# Patient Record
Sex: FEMALE | Race: WHITE | NOT HISPANIC OR LATINO | Employment: FULL TIME | URBAN - METROPOLITAN AREA
[De-identification: names, ages, dates, MRNs, and addresses within clinical notes are randomized per-mention and may not be internally consistent; named-entity substitution may affect disease eponyms.]

---

## 2022-10-04 ENCOUNTER — PATIENT MESSAGE (OUTPATIENT)
Dept: INTERNAL MEDICINE | Facility: CLINIC | Age: 52
End: 2022-10-04
Payer: COMMERCIAL

## 2022-11-22 ENCOUNTER — TELEPHONE (OUTPATIENT)
Dept: PRIMARY CARE CLINIC | Facility: CLINIC | Age: 52
End: 2022-11-22
Payer: COMMERCIAL

## 2022-11-22 ENCOUNTER — OFFICE VISIT (OUTPATIENT)
Dept: PRIMARY CARE CLINIC | Facility: CLINIC | Age: 52
End: 2022-11-22
Payer: COMMERCIAL

## 2022-11-22 VITALS
SYSTOLIC BLOOD PRESSURE: 90 MMHG | BODY MASS INDEX: 17.67 KG/M2 | WEIGHT: 106.06 LBS | DIASTOLIC BLOOD PRESSURE: 72 MMHG | HEART RATE: 76 BPM | OXYGEN SATURATION: 98 % | TEMPERATURE: 97 F | HEIGHT: 65 IN

## 2022-11-22 DIAGNOSIS — B00.1 HERPES LABIALIS: ICD-10-CM

## 2022-11-22 DIAGNOSIS — E28.319 PREMATURE MENOPAUSE: ICD-10-CM

## 2022-11-22 DIAGNOSIS — Z12.31 ENCOUNTER FOR SCREENING MAMMOGRAM FOR MALIGNANT NEOPLASM OF BREAST: Primary | ICD-10-CM

## 2022-11-22 DIAGNOSIS — Z00.00 PREVENTATIVE HEALTH CARE: ICD-10-CM

## 2022-11-22 PROCEDURE — 99999 PR PBB SHADOW E&M-EST. PATIENT-LVL V: CPT | Mod: PBBFAC,,, | Performed by: INTERNAL MEDICINE

## 2022-11-22 PROCEDURE — 3008F BODY MASS INDEX DOCD: CPT | Mod: CPTII,S$GLB,, | Performed by: INTERNAL MEDICINE

## 2022-11-22 PROCEDURE — 90686 IIV4 VACC NO PRSV 0.5 ML IM: CPT | Mod: S$GLB,,, | Performed by: INTERNAL MEDICINE

## 2022-11-22 PROCEDURE — 1159F PR MEDICATION LIST DOCUMENTED IN MEDICAL RECORD: ICD-10-PCS | Mod: CPTII,S$GLB,, | Performed by: INTERNAL MEDICINE

## 2022-11-22 PROCEDURE — 90471 IMMUNIZATION ADMIN: CPT | Mod: S$GLB,,, | Performed by: INTERNAL MEDICINE

## 2022-11-22 PROCEDURE — 3078F PR MOST RECENT DIASTOLIC BLOOD PRESSURE < 80 MM HG: ICD-10-PCS | Mod: CPTII,S$GLB,, | Performed by: INTERNAL MEDICINE

## 2022-11-22 PROCEDURE — 1159F MED LIST DOCD IN RCRD: CPT | Mod: CPTII,S$GLB,, | Performed by: INTERNAL MEDICINE

## 2022-11-22 PROCEDURE — 3078F DIAST BP <80 MM HG: CPT | Mod: CPTII,S$GLB,, | Performed by: INTERNAL MEDICINE

## 2022-11-22 PROCEDURE — 3008F PR BODY MASS INDEX (BMI) DOCUMENTED: ICD-10-PCS | Mod: CPTII,S$GLB,, | Performed by: INTERNAL MEDICINE

## 2022-11-22 PROCEDURE — 99999 PR PBB SHADOW E&M-EST. PATIENT-LVL V: ICD-10-PCS | Mod: PBBFAC,,, | Performed by: INTERNAL MEDICINE

## 2022-11-22 PROCEDURE — 3074F PR MOST RECENT SYSTOLIC BLOOD PRESSURE < 130 MM HG: ICD-10-PCS | Mod: CPTII,S$GLB,, | Performed by: INTERNAL MEDICINE

## 2022-11-22 PROCEDURE — 90686 FLU VACCINE (QUAD) GREATER THAN OR EQUAL TO 3YO PRESERVATIVE FREE IM: ICD-10-PCS | Mod: S$GLB,,, | Performed by: INTERNAL MEDICINE

## 2022-11-22 PROCEDURE — 90471 FLU VACCINE (QUAD) GREATER THAN OR EQUAL TO 3YO PRESERVATIVE FREE IM: ICD-10-PCS | Mod: S$GLB,,, | Performed by: INTERNAL MEDICINE

## 2022-11-22 PROCEDURE — 3074F SYST BP LT 130 MM HG: CPT | Mod: CPTII,S$GLB,, | Performed by: INTERNAL MEDICINE

## 2022-11-22 PROCEDURE — 99386 PR PREVENTIVE VISIT,NEW,40-64: ICD-10-PCS | Mod: 25,S$GLB,, | Performed by: INTERNAL MEDICINE

## 2022-11-22 PROCEDURE — 99386 PREV VISIT NEW AGE 40-64: CPT | Mod: 25,S$GLB,, | Performed by: INTERNAL MEDICINE

## 2022-11-22 RX ORDER — VALACYCLOVIR HYDROCHLORIDE 1 G/1
4000 TABLET, FILM COATED ORAL
COMMUNITY
Start: 2022-08-01

## 2022-11-22 NOTE — PATIENT INSTRUCTIONS
COVID bivalent booster    Get Shingrex x 2     Routine labs    Flu shot today    [phone] - send dr. Lamb requirements for permanent resident status    routine labs  -flu shot today  -get COVID booster and Shingrex  -check MMR titers and Hep B quantitative  - give H. influ Type B vaccine and Tdap vaccines in clinic

## 2022-11-22 NOTE — ASSESSMENT & PLAN NOTE
-routine labs  -flu shot today  -get COVID booster and Shingrex  -check MMR titers and Hep B quantitative  - give H. influ Type B vaccine and Tdap vaccines in clinic

## 2022-11-22 NOTE — PROGRESS NOTES
[facility] Progress Note      Chief Complaint     Annual Exam       Subjective:      History of Present Illness:  Teagan Krause is a 52 y.o. female here to establish care.    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    Past Surgical History:  No past surgical history on file.    Allergies:  Review of patient's allergies indicates:   Allergen Reactions    Lactase Other (See Comments)     Other reaction(s): Abdominal Pain       Home Medications:  Current Outpatient Medications   Medication Sig Dispense Refill    valACYclovir (VALTREX) 1000 MG tablet Take 4,000 mg by mouth.       No current facility-administered medications for this visit.        Family History:  No family history on file.    Social History:       Review of Systems:  Pertinent positives and negatives listed in HPI. All other systems are reviewed and are negative.    Health Maintaince :   The patient has no Health Maintenance topics of status Not Due        Eye: UTD  Dental: UTD    Immunizations:   Tdap: 2013.  Influenza: needs today.  Shingrex x 2: eligible  COVID: x 4; needs COVID booster   Hepatitis C: needs  Cancer Screening:  PAP: refuses for now; last pap at 30 y/o   Mammogram: needs  Colonoscopy: refuses for now    The ASCVD Risk score (Olga Lidia PRADO, et al., 2019) failed to calculate for the following reasons:     "Cannot find a previous HDL lab    Cannot find a previous total cholesterol lab    The smoking status is invalid      Objective:   BP 90/72   Pulse 76   Temp 97 °F (36.1 °C) (Oral)   Ht 5' 5" (1.651 m)   Wt 48.1 kg (106 lb 0.7 oz)   SpO2 98%   BMI 17.65 kg/m²      Body mass index is 17.65 kg/m².       Physical Examination:  General: Alert and awake in no apparent distress  HEENT: Normocephalic and atraumatic; Tms WNL  Eyes:  PERRL; EOMi with anicteric sclera and clear conjunctivae  Mouth:  Oropharynx clear and without exudate; moist mucous membranes  Neck:   Cervical nodes not enlarged; supple; no bruits  Cardio:  Regular rate and rhythm with normal S1 and S2; no murmurs or rubs  Resp:  CTAB; respirations unlabored; no wheezes, crackles or rhonchi  Abdom: Soft, NTND with normoactive bowel sounds; negative HSM  Extrem: Warm and well-perfused with no clubbing, cyanosis or edema  Skin:  No rashes, lesions, or color changes  Pulses:  2+ and symmetric distally  Neuro:  AAOx3; cooperative and pleasant with no focal deficits    Laboratory:      Most Recent Data:  Lab Results   Component Value Date    WBC 5.11 11/28/2006    HGB 13.2 11/28/2006    HCT 39.5 11/28/2006     11/28/2006    ALT 6 11/28/2006    AST 16 11/28/2006     11/28/2006    K 4.2 11/28/2006     11/28/2006    BUN 15 11/28/2006    CO2 27 11/28/2006    TSH 0.79 02/22/2007              CBC:   WBC   Date Value Ref Range Status   11/28/2006 5.11 4.8 - 10.8 K/uL Final     Hemoglobin   Date Value Ref Range Status   11/28/2006 13.2 12.0 - 16.0 gm/dl Final     Hematocrit   Date Value Ref Range Status   11/28/2006 39.5 37.0 - 48.5 % Final     Platelets   Date Value Ref Range Status   11/28/2006 303 150 - 350 K/uL Final     MCV   Date Value Ref Range Status   11/28/2006 93.8 82 - 95 fL Final     RDW   Date Value Ref Range Status   11/28/2006 12.0 11.5 - 14.5 % Final     BMP:   Sodium   Date Value Ref Range Status   11/28/2006 142 136 - 145 mMol/l " Final     Potassium   Date Value Ref Range Status   11/28/2006 4.2 3.3 - 5.3 mMol/l Final     Chloride   Date Value Ref Range Status   11/28/2006 101 95 - 110 mMol/l Final     CO2   Date Value Ref Range Status   11/28/2006 27 23.0 - 29.0 mEq/L Final     BUN   Date Value Ref Range Status   11/28/2006 15 5 - 23 mg/dl Final     Creatinine   Date Value Ref Range Status   11/28/2006 0.9 0.5 - 1.4 mg/dl Final     Glucose   Date Value Ref Range Status   11/28/2006 71 70 - 110 mg/dl Final     Calcium   Date Value Ref Range Status   11/28/2006 10.0 8.7 - 10.5 mg/dl Final     LFTs:   Total Protein   Date Value Ref Range Status   11/28/2006 7.7 6.0 - 8.4 gm/dl Final     Albumin   Date Value Ref Range Status   11/28/2006 4.8 3.5 - 5.2 g/dl Final     Total Bilirubin   Date Value Ref Range Status   11/28/2006 0.2 0.1 - 1.0 mg/dl Final     AST   Date Value Ref Range Status   11/28/2006 16 0 - 31 U/L Final     Alkaline Phosphatase   Date Value Ref Range Status   11/28/2006 56 45 - 130 U/L Final     ALT   Date Value Ref Range Status   11/28/2006 6 0 - 31 U/L Final     Coags: No results found for: INR, PROTIME, PTT  FLP:    No results found for: CHOL  No results found for: HDL  No results found for: LDLCALC  No results found for: TRIG  No results found for: CHOLHDL   DM:      Creatinine   Date Value Ref Range Status   11/28/2006 0.9 0.5 - 1.4 mg/dl Final     Thyroid:   TSH   Date Value Ref Range Status   02/22/2007 0.79 0.4 - 4.0 uIU/ml Final     T4, Total   Date Value Ref Range Status   02/22/2007 7.9 4.5 - 11.5 ug/dl Final     Anemia: No results found for: IRON, TIBC, FERRITIN, [id number], FOLATE  Cardiac: No results found for: TROPONINI, CKTOTAL, CKMB, BNP  Urinalysis: No results found for: LABURIN, COLORU, PHUA, CLARITYU, SPECGRAV, LABSPEC, NITRITE, PROTEINUR, GLUCOSEU, KETONESU, UROBILINOGEN, BILIRUBINUR, BLOODU, RBCU, WBCUA    Other Laboratory Data:      Other Results:  EKG (my interpretation):     Radiology:  No image results  found.          Assessment/Plan     Teagan Krause is a 52 y.o. female with:  1. Encounter for screening mammogram for malignant neoplasm of breast  -     Mammo Digital Diagnostic Bilat; Future; Expected date: 11/22/2022  -     Lipid Panel; Future; Expected date: 11/22/2022  -     Urinalysis; Future; Expected date: 11/22/2022  -     Comprehensive Metabolic Panel; Future; Expected date: 11/22/2022  -     CBC Auto Differential; Future; Expected date: 11/22/2022  -     Hepatitis C Antibody; Future; Expected date: 11/22/2022  -     HIV 1/2 Ag/Ab (4th Gen); Future; Expected date: 11/22/2022    2. Preventative health care  Assessment & Plan:  -routine labs  -flu shot today  -get COVID booster and Shingrex  -check MMR titers and Hep B quantitative  - give H. influ Type B vaccine and Tdap vaccines in clinic    Orders:  -     Mumps, IgG Screen; Future; Expected date: 11/22/2022  -     Rubeola antibody IgG; Future; Expected date: 11/22/2022  -     Rubella antibody, IgG; Future; Expected date: 11/22/2022  -     Hepatitis B Surface Ab, Quantitative; Future; Expected date: 11/22/2022  -     (In Office Administered) Tdap Vaccine  -     (In Office Administered) HiB (PRP-OMP)Conjugate Vaccine    3. Premature menopause  Assessment & Plan:  -check Vit D level and get DEXA  -cont weight-bearing exercise    Orders:  -     Vitamin D; Future; Expected date: 11/22/2022  -     DXA Bone Density Spine And Hip; Future; Expected date: 11/22/2022    4. Herpes labialis    Other orders  -     Influenza - Quadrivalent (PF)           I spent a total of 28 minutes on the day of the visit.This includes face to face time and non-face to face time preparing to see the patient (eg, review of tests), obtaining and/or reviewing separately obtained history, documenting clinical information in the electronic or other health record, independently interpreting results and communicating results to the patient/family/caregiver, or care coordinator.   Code  Status:     Cara Lamb MD

## 2022-12-20 ENCOUNTER — LAB VISIT (OUTPATIENT)
Dept: LAB | Facility: HOSPITAL | Age: 52
End: 2022-12-20
Payer: COMMERCIAL

## 2022-12-20 DIAGNOSIS — Z12.31 ENCOUNTER FOR SCREENING MAMMOGRAM FOR MALIGNANT NEOPLASM OF BREAST: ICD-10-CM

## 2022-12-20 DIAGNOSIS — Z00.00 PREVENTATIVE HEALTH CARE: ICD-10-CM

## 2022-12-20 DIAGNOSIS — E28.319 PREMATURE MENOPAUSE: ICD-10-CM

## 2022-12-20 LAB
25(OH)D3+25(OH)D2 SERPL-MCNC: 52 NG/ML (ref 30–96)
ALBUMIN SERPL BCP-MCNC: 4.3 G/DL (ref 3.5–5.2)
ALP SERPL-CCNC: 72 U/L (ref 55–135)
ALT SERPL W/O P-5'-P-CCNC: 8 U/L (ref 10–44)
ANION GAP SERPL CALC-SCNC: 11 MMOL/L (ref 8–16)
AST SERPL-CCNC: 19 U/L (ref 10–40)
BASOPHILS # BLD AUTO: 0.04 K/UL (ref 0–0.2)
BASOPHILS NFR BLD: 1.2 % (ref 0–1.9)
BILIRUB SERPL-MCNC: 0.4 MG/DL (ref 0.1–1)
BUN SERPL-MCNC: 16 MG/DL (ref 6–20)
CALCIUM SERPL-MCNC: 9.7 MG/DL (ref 8.7–10.5)
CHLORIDE SERPL-SCNC: 103 MMOL/L (ref 95–110)
CHOLEST SERPL-MCNC: 236 MG/DL (ref 120–199)
CHOLEST/HDLC SERPL: 2.9 {RATIO} (ref 2–5)
CO2 SERPL-SCNC: 25 MMOL/L (ref 23–29)
CREAT SERPL-MCNC: 0.7 MG/DL (ref 0.5–1.4)
DIFFERENTIAL METHOD: ABNORMAL
EOSINOPHIL # BLD AUTO: 0.1 K/UL (ref 0–0.5)
EOSINOPHIL NFR BLD: 1.8 % (ref 0–8)
ERYTHROCYTE [DISTWIDTH] IN BLOOD BY AUTOMATED COUNT: 12.7 % (ref 11.5–14.5)
EST. GFR  (NO RACE VARIABLE): >60 ML/MIN/1.73 M^2
GLUCOSE SERPL-MCNC: 81 MG/DL (ref 70–110)
HCT VFR BLD AUTO: 42.4 % (ref 37–48.5)
HCV AB SERPL QL IA: NORMAL
HDLC SERPL-MCNC: 81 MG/DL (ref 40–75)
HDLC SERPL: 34.3 % (ref 20–50)
HGB BLD-MCNC: 13.5 G/DL (ref 12–16)
HIV 1+2 AB+HIV1 P24 AG SERPL QL IA: NORMAL
IMM GRANULOCYTES # BLD AUTO: 0 K/UL (ref 0–0.04)
IMM GRANULOCYTES NFR BLD AUTO: 0 % (ref 0–0.5)
LDLC SERPL CALC-MCNC: 135.6 MG/DL (ref 63–159)
LYMPHOCYTES # BLD AUTO: 1.6 K/UL (ref 1–4.8)
LYMPHOCYTES NFR BLD: 47.8 % (ref 18–48)
MCH RBC QN AUTO: 30.2 PG (ref 27–31)
MCHC RBC AUTO-ENTMCNC: 31.8 G/DL (ref 32–36)
MCV RBC AUTO: 95 FL (ref 82–98)
MONOCYTES # BLD AUTO: 0.3 K/UL (ref 0.3–1)
MONOCYTES NFR BLD: 8.3 % (ref 4–15)
NEUTROPHILS # BLD AUTO: 1.4 K/UL (ref 1.8–7.7)
NEUTROPHILS NFR BLD: 40.9 % (ref 38–73)
NONHDLC SERPL-MCNC: 155 MG/DL
NRBC BLD-RTO: 0 /100 WBC
PLATELET # BLD AUTO: 256 K/UL (ref 150–450)
PMV BLD AUTO: 10.9 FL (ref 9.2–12.9)
POTASSIUM SERPL-SCNC: 3.8 MMOL/L (ref 3.5–5.1)
PROT SERPL-MCNC: 7.5 G/DL (ref 6–8.4)
RBC # BLD AUTO: 4.47 M/UL (ref 4–5.4)
SODIUM SERPL-SCNC: 139 MMOL/L (ref 136–145)
TRIGL SERPL-MCNC: 97 MG/DL (ref 30–150)
WBC # BLD AUTO: 3.39 K/UL (ref 3.9–12.7)

## 2022-12-20 PROCEDURE — 87389 HIV-1 AG W/HIV-1&-2 AB AG IA: CPT | Performed by: INTERNAL MEDICINE

## 2022-12-20 PROCEDURE — 82306 VITAMIN D 25 HYDROXY: CPT | Performed by: INTERNAL MEDICINE

## 2022-12-20 PROCEDURE — 80053 COMPREHEN METABOLIC PANEL: CPT | Performed by: INTERNAL MEDICINE

## 2022-12-20 PROCEDURE — 80061 LIPID PANEL: CPT | Performed by: INTERNAL MEDICINE

## 2022-12-20 PROCEDURE — 85025 COMPLETE CBC W/AUTO DIFF WBC: CPT | Performed by: INTERNAL MEDICINE

## 2022-12-20 PROCEDURE — 86735 MUMPS ANTIBODY: CPT | Performed by: INTERNAL MEDICINE

## 2022-12-20 PROCEDURE — 86803 HEPATITIS C AB TEST: CPT | Performed by: INTERNAL MEDICINE

## 2022-12-20 PROCEDURE — 86762 RUBELLA ANTIBODY: CPT | Performed by: INTERNAL MEDICINE

## 2022-12-20 PROCEDURE — 36415 COLL VENOUS BLD VENIPUNCTURE: CPT | Mod: PN | Performed by: INTERNAL MEDICINE

## 2022-12-20 PROCEDURE — 86765 RUBEOLA ANTIBODY: CPT | Performed by: INTERNAL MEDICINE

## 2022-12-21 ENCOUNTER — PATIENT MESSAGE (OUTPATIENT)
Dept: PRIMARY CARE CLINIC | Facility: CLINIC | Age: 52
End: 2022-12-21
Payer: COMMERCIAL

## 2022-12-21 LAB
RUBV IGG SER-ACNC: 24 IU/ML
RUBV IGG SER-IMP: REACTIVE

## 2022-12-22 LAB
MUMPS IGG INTERPRETATION: POSITIVE
MUMPS IGG SCREEN: 1.93 ISR (ref 0–0.9)
RUBEOLA IGG ANTIBODY: 2.1 ISR (ref 0–0.9)
RUBEOLA INTERPRETATION: POSITIVE

## 2022-12-27 ENCOUNTER — LAB VISIT (OUTPATIENT)
Dept: LAB | Facility: HOSPITAL | Age: 52
End: 2022-12-27
Attending: INTERNAL MEDICINE
Payer: COMMERCIAL

## 2022-12-27 ENCOUNTER — OFFICE VISIT (OUTPATIENT)
Dept: PRIMARY CARE CLINIC | Facility: CLINIC | Age: 52
End: 2022-12-27
Payer: COMMERCIAL

## 2022-12-27 VITALS
BODY MASS INDEX: 20.77 KG/M2 | OXYGEN SATURATION: 98 % | WEIGHT: 105.81 LBS | DIASTOLIC BLOOD PRESSURE: 74 MMHG | HEIGHT: 60 IN | SYSTOLIC BLOOD PRESSURE: 88 MMHG | HEART RATE: 72 BPM | TEMPERATURE: 98 F

## 2022-12-27 DIAGNOSIS — D70.9 NEUTROPENIA, UNSPECIFIED TYPE: ICD-10-CM

## 2022-12-27 DIAGNOSIS — D70.8 OTHER NEUTROPENIA: ICD-10-CM

## 2022-12-27 DIAGNOSIS — Z00.00 PREVENTATIVE HEALTH CARE: Primary | ICD-10-CM

## 2022-12-27 LAB
BASOPHILS # BLD AUTO: 0.04 K/UL (ref 0–0.2)
BASOPHILS NFR BLD: 0.9 % (ref 0–1.9)
DIFFERENTIAL METHOD: ABNORMAL
EOSINOPHIL # BLD AUTO: 0.1 K/UL (ref 0–0.5)
EOSINOPHIL NFR BLD: 1.6 % (ref 0–8)
ERYTHROCYTE [DISTWIDTH] IN BLOOD BY AUTOMATED COUNT: 12.5 % (ref 11.5–14.5)
HCT VFR BLD AUTO: 41.4 % (ref 37–48.5)
HGB BLD-MCNC: 13.4 G/DL (ref 12–16)
IMM GRANULOCYTES # BLD AUTO: 0.01 K/UL (ref 0–0.04)
IMM GRANULOCYTES NFR BLD AUTO: 0.2 % (ref 0–0.5)
LYMPHOCYTES # BLD AUTO: 2.3 K/UL (ref 1–4.8)
LYMPHOCYTES NFR BLD: 54.3 % (ref 18–48)
MCH RBC QN AUTO: 31 PG (ref 27–31)
MCHC RBC AUTO-ENTMCNC: 32.4 G/DL (ref 32–36)
MCV RBC AUTO: 96 FL (ref 82–98)
MONOCYTES # BLD AUTO: 0.3 K/UL (ref 0.3–1)
MONOCYTES NFR BLD: 7.5 % (ref 4–15)
NEUTROPHILS # BLD AUTO: 1.5 K/UL (ref 1.8–7.7)
NEUTROPHILS NFR BLD: 35.5 % (ref 38–73)
NRBC BLD-RTO: 0 /100 WBC
PLATELET # BLD AUTO: 295 K/UL (ref 150–450)
PMV BLD AUTO: 10.5 FL (ref 9.2–12.9)
RBC # BLD AUTO: 4.32 M/UL (ref 4–5.4)
WBC # BLD AUTO: 4.29 K/UL (ref 3.9–12.7)

## 2022-12-27 PROCEDURE — 3078F PR MOST RECENT DIASTOLIC BLOOD PRESSURE < 80 MM HG: ICD-10-PCS | Mod: CPTII,S$GLB,, | Performed by: INTERNAL MEDICINE

## 2022-12-27 PROCEDURE — 36415 COLL VENOUS BLD VENIPUNCTURE: CPT | Mod: PN | Performed by: INTERNAL MEDICINE

## 2022-12-27 PROCEDURE — 85025 COMPLETE CBC W/AUTO DIFF WBC: CPT | Performed by: INTERNAL MEDICINE

## 2022-12-27 PROCEDURE — 1159F PR MEDICATION LIST DOCUMENTED IN MEDICAL RECORD: ICD-10-PCS | Mod: CPTII,S$GLB,, | Performed by: INTERNAL MEDICINE

## 2022-12-27 PROCEDURE — 99396 PREV VISIT EST AGE 40-64: CPT | Mod: S$GLB,,, | Performed by: INTERNAL MEDICINE

## 2022-12-27 PROCEDURE — 3008F BODY MASS INDEX DOCD: CPT | Mod: CPTII,S$GLB,, | Performed by: INTERNAL MEDICINE

## 2022-12-27 PROCEDURE — 99396 PR PREVENTIVE VISIT,EST,40-64: ICD-10-PCS | Mod: S$GLB,,, | Performed by: INTERNAL MEDICINE

## 2022-12-27 PROCEDURE — 99999 PR PBB SHADOW E&M-EST. PATIENT-LVL IV: ICD-10-PCS | Mod: PBBFAC,,, | Performed by: INTERNAL MEDICINE

## 2022-12-27 PROCEDURE — 3008F PR BODY MASS INDEX (BMI) DOCUMENTED: ICD-10-PCS | Mod: CPTII,S$GLB,, | Performed by: INTERNAL MEDICINE

## 2022-12-27 PROCEDURE — 3078F DIAST BP <80 MM HG: CPT | Mod: CPTII,S$GLB,, | Performed by: INTERNAL MEDICINE

## 2022-12-27 PROCEDURE — 3074F PR MOST RECENT SYSTOLIC BLOOD PRESSURE < 130 MM HG: ICD-10-PCS | Mod: CPTII,S$GLB,, | Performed by: INTERNAL MEDICINE

## 2022-12-27 PROCEDURE — 1159F MED LIST DOCD IN RCRD: CPT | Mod: CPTII,S$GLB,, | Performed by: INTERNAL MEDICINE

## 2022-12-27 PROCEDURE — 3074F SYST BP LT 130 MM HG: CPT | Mod: CPTII,S$GLB,, | Performed by: INTERNAL MEDICINE

## 2022-12-27 PROCEDURE — 99999 PR PBB SHADOW E&M-EST. PATIENT-LVL IV: CPT | Mod: PBBFAC,,, | Performed by: INTERNAL MEDICINE

## 2022-12-27 NOTE — PATIENT INSTRUCTIONS
Check Hep B surface B quant for immunity today  -will ultimately need TdaP, H. Influ, IPV from [facility] vaccine clinic  -COVID booster and FLU done  -MMR immune

## 2022-12-27 NOTE — PROGRESS NOTES
[facility] Progress Note      Chief Complaint     Follow-up   Need vaccines for green card    Subjective:      History of Present Illness:  Teagan Krause is a 52 y.o. female here to review results of labs drawn on 12/20/2022 and to discuss vaccine needs    Patient was not able to get needed vaccines from the travel clinic but will make an appt to get vaccines through another O[facility] clinic where her friend from [place] also went to a different [facility] vaccination clinic and was able to get all vaccines needs. Pt still needs  Tdap,  polio, H flu and still need to check Hep B immunity today since this labs was inadverdently not drawn on 12/20/2022.  COVID and flu are UTD    Neutropenia 1400 and Leukopenia 3,390: pt had been recovering from a viral syndrome and felt very tired and sick. She has now fully recovered and would like labs repeated.  She does not get sick very often.     Past Medical History:  No past medical history on file.    Past Surgical History:  No past surgical history on file.    Allergies:  Review of patient's allergies indicates:   Allergen Reactions    Lactase Other (See Comments)     Other reaction(s): Abdominal Pain       Home Medications:  Current Outpatient Medications   Medication Sig Dispense Refill    valACYclovir (VALTREX) 1000 MG tablet Take 4,000 mg by mouth.       No current facility-administered medications for this visit.        Family History:  No family history on file.    Social History:  Social History     Tobacco Use    Smoking status: Never    Smokeless tobacco: Never       Review of Systems:  Pertinent positives and negatives listed in HPI. All other systems are reviewed and are negative.    Health Maintaince :   Health Maintenance Topics with due status: Not Due       Topic Last Completion Date    Lipid Panel 12/20/2022           Eye:   Dental:     Immunizations:       The ASCVD Risk score (Olga Lidia PRADO, et al., 2019) failed to calculate for the following  reasons:    The valid systolic blood pressure range is 90 to 200 mmHg      Objective:   BP (!) 88/74   Pulse 72   Temp 97.7 °F (36.5 °C) (Oral)   Ht 5' (1.524 m)   Wt 48 kg (105 lb 13.1 oz)   SpO2 98%   BMI 20.67 kg/m²      Body mass index is 20.67 kg/m².       Physical Examination:  General: Alert and awake in no apparent distress  Neuro:  AAOx3; cooperative and pleasant with no focal deficits    Laboratory:      Most Recent Data:  Lab Results   Component Value Date    WBC 3.39 (L) 12/20/2022    HGB 13.5 12/20/2022    HCT 42.4 12/20/2022     12/20/2022    CHOL 236 (H) 12/20/2022    TRIG 97 12/20/2022    HDL 81 (H) 12/20/2022    ALT 8 (L) 12/20/2022    AST 19 12/20/2022     12/20/2022    K 3.8 12/20/2022     12/20/2022    BUN 16 12/20/2022    CO2 25 12/20/2022    TSH 0.79 02/22/2007              CBC:   WBC   Date Value Ref Range Status   12/20/2022 3.39 (L) 3.90 - 12.70 K/uL Final     Hemoglobin   Date Value Ref Range Status   12/20/2022 13.5 12.0 - 16.0 g/dL Final     Hematocrit   Date Value Ref Range Status   12/20/2022 42.4 37.0 - 48.5 % Final     Platelets   Date Value Ref Range Status   12/20/2022 256 150 - 450 K/uL Final     MCV   Date Value Ref Range Status   12/20/2022 95 82 - 98 fL Final     RDW   Date Value Ref Range Status   12/20/2022 12.7 11.5 - 14.5 % Final     BMP:   Sodium   Date Value Ref Range Status   12/20/2022 139 136 - 145 mmol/L Final     Potassium   Date Value Ref Range Status   12/20/2022 3.8 3.5 - 5.1 mmol/L Final     Chloride   Date Value Ref Range Status   12/20/2022 103 95 - 110 mmol/L Final     CO2   Date Value Ref Range Status   12/20/2022 25 23 - 29 mmol/L Final     BUN   Date Value Ref Range Status   12/20/2022 16 6 - 20 mg/dL Final     Creatinine   Date Value Ref Range Status   12/20/2022 0.7 0.5 - 1.4 mg/dL Final     Glucose   Date Value Ref Range Status   12/20/2022 81 70 - 110 mg/dL Final     Calcium   Date Value Ref Range Status   12/20/2022 9.7 8.7 -  10.5 mg/dL Final     LFTs:   Total Protein   Date Value Ref Range Status   12/20/2022 7.5 6.0 - 8.4 g/dL Final     Albumin   Date Value Ref Range Status   12/20/2022 4.3 3.5 - 5.2 g/dL Final     Total Bilirubin   Date Value Ref Range Status   12/20/2022 0.4 0.1 - 1.0 mg/dL Final     Comment:     For infants and newborns, interpretation of results should be based  on gestational age, weight and in agreement with clinical  observations.    Premature Infant recommended reference ranges:  Up to 24 hours.............<8.0 mg/dL  Up to 48 hours............<12.0 mg/dL  3-5 days..................<15.0 mg/dL  6-29 days.................<15.0 mg/dL       AST   Date Value Ref Range Status   12/20/2022 19 10 - 40 U/L Final     Alkaline Phosphatase   Date Value Ref Range Status   12/20/2022 72 55 - 135 U/L Final     ALT   Date Value Ref Range Status   12/20/2022 8 (L) 10 - 44 U/L Final     Coags: No results found for: INR, PROTIME, PTT  FLP:      Lab Results   Component Value Date    CHOL 236 (H) 12/20/2022     Lab Results   Component Value Date    HDL 81 (H) 12/20/2022     Lab Results   Component Value Date    LDLCALC 135.6 12/20/2022     Lab Results   Component Value Date    TRIG 97 12/20/2022     Lab Results   Component Value Date    CHOLHDL 34.3 12/20/2022      DM:      LDL Cholesterol   Date Value Ref Range Status   12/20/2022 135.6 63.0 - 159.0 mg/dL Final     Comment:     The National Cholesterol Education Program (NCEP) has set the  following guidelines (reference values) for LDL Cholesterol:  Optimal.......................<130 mg/dL  Borderline High...............130-159 mg/dL  High..........................160-189 mg/dL  Very High.....................>190 mg/dL       Creatinine   Date Value Ref Range Status   12/20/2022 0.7 0.5 - 1.4 mg/dL Final     Thyroid:   TSH   Date Value Ref Range Status   02/22/2007 0.79 0.4 - 4.0 uIU/ml Final     T4, Total   Date Value Ref Range Status   02/22/2007 7.9 4.5 - 11.5 ug/dl Final      Anemia: No results found for: IRON, TIBC, FERRITIN, [id number], FOLATE  Cardiac: No results found for: TROPONINI, CKTOTAL, CKMB, BNP  Urinalysis:   Color, UA   Date Value Ref Range Status   12/20/2022 Yellow Yellow, Straw, June Final     Specific Gravity, UA   Date Value Ref Range Status   12/20/2022 1.025 1.005 - 1.030 Final     Nitrite, UA   Date Value Ref Range Status   12/20/2022 Negative Negative Final     Ketones, UA   Date Value Ref Range Status   12/20/2022 Negative Negative Final       Other Laboratory Data:      Other Results:  EKG (my interpretation):     Radiology:  No image results found.          Assessment/Plan     Teagan Krause is a 52 y.o. female with:  1. Preventative health care  -     Hepatitis B Surface Ab, Quantitative; Future; Expected date: 12/27/2022             I spent a total of 34 minutes on the day of the visit.This includes face to face time and non-face to face time preparing to see the patient (eg, review of tests), obtaining and/or reviewing separately obtained history, documenting clinical information in the electronic or other health record, independently interpreting results and communicating results to the patient/family/caregiver, or care coordinator.   Code Status:     Cara Lamb MD

## 2023-01-03 ENCOUNTER — LAB VISIT (OUTPATIENT)
Dept: LAB | Facility: HOSPITAL | Age: 53
End: 2023-01-03
Payer: COMMERCIAL

## 2023-01-03 ENCOUNTER — CLINICAL SUPPORT (OUTPATIENT)
Dept: INFECTIOUS DISEASES | Facility: CLINIC | Age: 53
End: 2023-01-03
Payer: COMMERCIAL

## 2023-01-03 ENCOUNTER — OFFICE VISIT (OUTPATIENT)
Dept: INFECTIOUS DISEASES | Facility: CLINIC | Age: 53
End: 2023-01-03
Payer: COMMERCIAL

## 2023-01-03 VITALS
HEART RATE: 76 BPM | DIASTOLIC BLOOD PRESSURE: 58 MMHG | SYSTOLIC BLOOD PRESSURE: 104 MMHG | WEIGHT: 108.25 LBS | BODY MASS INDEX: 21.25 KG/M2 | TEMPERATURE: 99 F | HEIGHT: 60 IN

## 2023-01-03 DIAGNOSIS — Z00.00 ROUTINE HEALTH MAINTENANCE: ICD-10-CM

## 2023-01-03 DIAGNOSIS — Z00.00 ROUTINE HEALTH MAINTENANCE: Primary | ICD-10-CM

## 2023-01-03 LAB
HAV IGG SER QL IA: NORMAL
HBV CORE AB SERPL QL IA: NORMAL
HBV SURFACE AG SERPL QL IA: NORMAL

## 2023-01-03 PROCEDURE — 3008F PR BODY MASS INDEX (BMI) DOCUMENTED: ICD-10-PCS | Mod: CPTII,S$GLB,, | Performed by: PHYSICIAN ASSISTANT

## 2023-01-03 PROCEDURE — 3078F DIAST BP <80 MM HG: CPT | Mod: CPTII,S$GLB,, | Performed by: PHYSICIAN ASSISTANT

## 2023-01-03 PROCEDURE — 3074F SYST BP LT 130 MM HG: CPT | Mod: CPTII,S$GLB,, | Performed by: PHYSICIAN ASSISTANT

## 2023-01-03 PROCEDURE — 90472 IMMUNIZATION ADMIN EACH ADD: CPT | Mod: S$GLB,,, | Performed by: INTERNAL MEDICINE

## 2023-01-03 PROCEDURE — 1159F PR MEDICATION LIST DOCUMENTED IN MEDICAL RECORD: ICD-10-PCS | Mod: CPTII,S$GLB,, | Performed by: PHYSICIAN ASSISTANT

## 2023-01-03 PROCEDURE — 90471 PNEUMOCOCCAL CONJUGATE VACCINE 20-VALENT: ICD-10-PCS | Mod: S$GLB,,, | Performed by: INTERNAL MEDICINE

## 2023-01-03 PROCEDURE — 3078F PR MOST RECENT DIASTOLIC BLOOD PRESSURE < 80 MM HG: ICD-10-PCS | Mod: CPTII,S$GLB,, | Performed by: PHYSICIAN ASSISTANT

## 2023-01-03 PROCEDURE — 3008F BODY MASS INDEX DOCD: CPT | Mod: CPTII,S$GLB,, | Performed by: PHYSICIAN ASSISTANT

## 2023-01-03 PROCEDURE — 86787 VARICELLA-ZOSTER ANTIBODY: CPT | Performed by: PHYSICIAN ASSISTANT

## 2023-01-03 PROCEDURE — 90472 TDAP VACCINE GREATER THAN OR EQUAL TO 7YO IM: ICD-10-PCS | Mod: S$GLB,,, | Performed by: INTERNAL MEDICINE

## 2023-01-03 PROCEDURE — 90677 PNEUMOCOCCAL CONJUGATE VACCINE 20-VALENT: ICD-10-PCS | Mod: S$GLB,,, | Performed by: INTERNAL MEDICINE

## 2023-01-03 PROCEDURE — 90715 TDAP VACCINE 7 YRS/> IM: CPT | Mod: S$GLB,,, | Performed by: INTERNAL MEDICINE

## 2023-01-03 PROCEDURE — 86790 VIRUS ANTIBODY NOS: CPT | Performed by: PHYSICIAN ASSISTANT

## 2023-01-03 PROCEDURE — 86706 HEP B SURFACE ANTIBODY: CPT | Performed by: PHYSICIAN ASSISTANT

## 2023-01-03 PROCEDURE — 86382 NEUTRALIZATION TEST VIRAL: CPT | Mod: 59 | Performed by: PHYSICIAN ASSISTANT

## 2023-01-03 PROCEDURE — 90471 IMMUNIZATION ADMIN: CPT | Mod: S$GLB,,, | Performed by: INTERNAL MEDICINE

## 2023-01-03 PROCEDURE — 36415 COLL VENOUS BLD VENIPUNCTURE: CPT | Performed by: PHYSICIAN ASSISTANT

## 2023-01-03 PROCEDURE — 3074F PR MOST RECENT SYSTOLIC BLOOD PRESSURE < 130 MM HG: ICD-10-PCS | Mod: CPTII,S$GLB,, | Performed by: PHYSICIAN ASSISTANT

## 2023-01-03 PROCEDURE — 99999 PR PBB SHADOW E&M-EST. PATIENT-LVL III: CPT | Mod: PBBFAC,,, | Performed by: PHYSICIAN ASSISTANT

## 2023-01-03 PROCEDURE — 1159F MED LIST DOCD IN RCRD: CPT | Mod: CPTII,S$GLB,, | Performed by: PHYSICIAN ASSISTANT

## 2023-01-03 PROCEDURE — 99204 PR OFFICE/OUTPT VISIT, NEW, LEVL IV, 45-59 MIN: ICD-10-PCS | Mod: S$GLB,,, | Performed by: PHYSICIAN ASSISTANT

## 2023-01-03 PROCEDURE — 99999 PR PBB SHADOW E&M-EST. PATIENT-LVL III: ICD-10-PCS | Mod: PBBFAC,,, | Performed by: PHYSICIAN ASSISTANT

## 2023-01-03 PROCEDURE — 99204 OFFICE O/P NEW MOD 45 MIN: CPT | Mod: S$GLB,,, | Performed by: PHYSICIAN ASSISTANT

## 2023-01-03 PROCEDURE — 90715 TDAP VACCINE GREATER THAN OR EQUAL TO 7YO IM: ICD-10-PCS | Mod: S$GLB,,, | Performed by: INTERNAL MEDICINE

## 2023-01-03 PROCEDURE — 90648 HIB PRP-T CONJUGATE VACCINE 4 DOSE IM: ICD-10-PCS | Mod: S$GLB,,, | Performed by: INTERNAL MEDICINE

## 2023-01-03 PROCEDURE — 99999 PR PBB SHADOW E&M-EST. PATIENT-LVL I: ICD-10-PCS | Mod: PBBFAC,,,

## 2023-01-03 PROCEDURE — 99999 PR PBB SHADOW E&M-EST. PATIENT-LVL I: CPT | Mod: PBBFAC,,,

## 2023-01-03 PROCEDURE — 90648 HIB PRP-T VACCINE 4 DOSE IM: CPT | Mod: S$GLB,,, | Performed by: INTERNAL MEDICINE

## 2023-01-03 PROCEDURE — 87340 HEPATITIS B SURFACE AG IA: CPT | Performed by: PHYSICIAN ASSISTANT

## 2023-01-03 PROCEDURE — 90677 PCV20 VACCINE IM: CPT | Mod: S$GLB,,, | Performed by: INTERNAL MEDICINE

## 2023-01-03 PROCEDURE — 86704 HEP B CORE ANTIBODY TOTAL: CPT | Performed by: PHYSICIAN ASSISTANT

## 2023-01-03 NOTE — PROGRESS NOTES
Patient received 2 vaccines IM to the right deltoid, HIB anterior, and Prevnar 20 posterior.  And also Tdap IM to the left deltoid.  Tolerated well and left in NAD

## 2023-01-03 NOTE — PROGRESS NOTES
Subjective:       Patient ID: Teagan Krause is a 52 y.o. female.    Chief Complaint: Follow-up    HPI    53 y/o female presents to ID clinic for vaccinations and blood work for immigration. No acute complaints or concerns. Paperwork reviewed in clinic      Review of Systems   All other systems reviewed and are negative.      Objective:      Physical Exam  Vitals and nursing note reviewed.   Constitutional:       General: She is not in acute distress.     Appearance: She is well-developed. She is not diaphoretic.   HENT:      Head: Normocephalic and atraumatic.   Eyes:      Pupils: Pupils are equal, round, and reactive to light.   Cardiovascular:      Rate and Rhythm: Normal rate and regular rhythm.      Heart sounds: Normal heart sounds.   Pulmonary:      Effort: Pulmonary effort is normal.      Breath sounds: Normal breath sounds.   Musculoskeletal:         General: Normal range of motion.      Cervical back: Normal range of motion and neck supple.   Skin:     General: Skin is warm and dry.      Coloration: Skin is not pale.      Findings: No erythema.   Neurological:      Mental Status: She is alert and oriented to person, place, and time.      Cranial Nerves: No cranial nerve deficit.      Coordination: Coordination normal.   Psychiatric:         Behavior: Behavior normal.         Thought Content: Thought content normal.       Assessment:       Problem List Items Addressed This Visit    None  Visit Diagnoses       Routine health maintenance    -  Primary    Relevant Orders    POLIOVIRUS ANTIBODIES, TYPES 1, 2, AND 3    Varicella Zoster Antibody, IgG    HEPATITIS B SURFACE ANTIBODY, QUAL/QUANT              Plan:           Will check polio, varicella, hepatitis B surface ab, if ngeative will need vaccinations     Vaccines today  hiB  Prevnar 20  Tdap  Will need shingrix and meningitis vaccines     Vaccines recommended/required for immigration in adult population  Tdap within 10 years   IPV if not immune  MMR if  not immune  Hepatitis A if not immune  Hepatitis B if not immune  Meningococcal disease ([place])   Varicella if not immune  Pneumococcal disease (prevnar 20)  Influenza   Covid   >35 minutes of total time spent on the encounter, which includes face to face time and non-face to face time preparing to see the patient (eg, review of tests), Obtaining and/or reviewing separately obtained history, Documenting clinical information in the electronic or other health record, Independently interpreting results (not separately reported) and communicating results to the patient/family/caregiver, or Care coordination (not separately reported).

## 2023-01-04 LAB
VARICELLA INTERPRETATION: POSITIVE
VARICELLA ZOSTER IGG: 2.22 ISR (ref 0–0.9)

## 2023-01-10 LAB
HBV SURFACE AB SER QL IA: NEGATIVE
HBV SURFACE AB SERPL IA-ACNC: <3 MIU/ML

## 2023-01-11 LAB
POLIO 1: NORMAL
POLIO 3: NORMAL

## 2023-02-27 PROBLEM — Z00.00 PREVENTATIVE HEALTH CARE: Status: RESOLVED | Noted: 2022-11-22 | Resolved: 2023-02-27

## 2023-03-06 ENCOUNTER — CLINICAL SUPPORT (OUTPATIENT)
Dept: INFECTIOUS DISEASES | Facility: CLINIC | Age: 53
End: 2023-03-06
Payer: COMMERCIAL

## 2023-03-06 ENCOUNTER — TELEPHONE (OUTPATIENT)
Dept: INFECTIOUS DISEASES | Facility: CLINIC | Age: 53
End: 2023-03-06
Payer: COMMERCIAL

## 2023-03-06 DIAGNOSIS — D70.8 OTHER NEUTROPENIA: Primary | ICD-10-CM

## 2023-03-06 DIAGNOSIS — Z00.00 ROUTINE HEALTH MAINTENANCE: ICD-10-CM

## 2023-03-06 PROCEDURE — 90750 HZV VACC RECOMBINANT IM: CPT | Mod: S$GLB,,, | Performed by: INTERNAL MEDICINE

## 2023-03-06 PROCEDURE — 90734 MENACWYD/MENACWYCRM VACC IM: CPT | Mod: S$GLB,,, | Performed by: INTERNAL MEDICINE

## 2023-03-06 PROCEDURE — 90472 ZOSTER RECOMBINANT VACCINE: ICD-10-PCS | Mod: S$GLB,,, | Performed by: INTERNAL MEDICINE

## 2023-03-06 PROCEDURE — 90750 ZOSTER RECOMBINANT VACCINE: ICD-10-PCS | Mod: S$GLB,,, | Performed by: INTERNAL MEDICINE

## 2023-03-06 PROCEDURE — 90471 MENINGOCOCCAL CONJUGATE VACCINE 4-VALENT IM (MENVEO): ICD-10-PCS | Mod: S$GLB,,, | Performed by: INTERNAL MEDICINE

## 2023-03-06 PROCEDURE — 90472 IMMUNIZATION ADMIN EACH ADD: CPT | Mod: S$GLB,,, | Performed by: INTERNAL MEDICINE

## 2023-03-06 PROCEDURE — 90636 HEP A/HEP B VACC ADULT IM: CPT | Mod: S$GLB,,, | Performed by: INTERNAL MEDICINE

## 2023-03-06 PROCEDURE — 90471 IMMUNIZATION ADMIN: CPT | Mod: S$GLB,,, | Performed by: INTERNAL MEDICINE

## 2023-03-06 PROCEDURE — 99999 PR PBB SHADOW E&M-EST. PATIENT-LVL I: ICD-10-PCS | Mod: PBBFAC,,,

## 2023-03-06 PROCEDURE — 99999 PR PBB SHADOW E&M-EST. PATIENT-LVL I: CPT | Mod: PBBFAC,,,

## 2023-03-06 PROCEDURE — 90636 HEPATITIS A HEPATITIS B COMBINED VACCINE IM: ICD-10-PCS | Mod: S$GLB,,, | Performed by: INTERNAL MEDICINE

## 2023-03-06 PROCEDURE — 90734 MENINGOCOCCAL CONJUGATE VACCINE 4-VALENT IM (MENVEO): ICD-10-PCS | Mod: S$GLB,,, | Performed by: INTERNAL MEDICINE

## 2023-03-06 NOTE — PROGRESS NOTES
Patient received zoster #1 and Twinrix #1 vaccine in the left deltoid, and Menveo vaccine in the right deltoid. Pt tolerated well. Pt asked to wait in the clinic 15 minutes after injection in the event of an allergic reaction. Pt verbalized understanding. Pt left in NAD.     [facility]    Patient: Kym Keith, 78 y.o.   : 1954    Pt of Dr. Parminder Montoya   Patient presents with    Follow-up     4 month COPD no testing        HPI  Maryann Field is here for follow up for COPD. Former smoker quit years ago  Last PFT done 22 Moderate obstruction  A1A MM  No pulmonary complaints today   Currently on Trelegy per PCP states he can only afford half of the year  Albuterol use is rare  No home oxygen use     Patient is experiencing SOB: no     Patient is experiencing wheezing: No     Patient states they have had a Absent = 4 cough. Phlegm is none     Patient is coughing up blood: no     Patient has been experiencing chest pains: non-existent     Patient is currently taking the following inhaler(s): Trelegy 100, Proventil     Patient is using their rescue inhaler rarely. Patient needs refills of the following medications: Trelegy 100     All refills should be sent to [facility]     Other: Can only afford Trelegy 100 for half the year. Progress History:   Since last visit any new medical issues? No  New ER or hospital visits? No  Any new or changes in medicines? No  Using inhalers? Yes   Are they helpful?  Yes   Flu vaccine UTD  Pneumonia vaccine UTD  Covid vaccinations done plus one booster   Past Medical hx   PMH:  Past Medical History:   Diagnosis Date    Asthma     Benign familial tremor     Bipolar depression ([facility] [address]     COPD (chronic obstructive pulmonary disease) ([facility] [address]     Hypertension     Social anxiety disorder      SURGICAL HISTORY:  Past Surgical History:   Procedure Laterality Date    DENTAL SURGERY      all teeth pulled at the age of 28    HERNIA REPAIR      OTHER SURGICAL HISTORY      surgery for chronic granuloma    THROAT SURGERY  2009    non-cancerous mass    TONSILLECTOMY       SOCIAL HISTORY:  Social History     Tobacco Use    Smoking status: Former     Packs/day: 1.50     Years: 35.00     Pack years: 52.50     Types: Cigarettes     Start date: 1968     Quit date: 2000     Years since quittin.5    Smokeless tobacco: Never    Tobacco comments:     smokes cannibis daily   Vaping Use    Vaping Use: Former   Substance Use Topics    Alcohol use:  Yes     Alcohol/week: 4.0 - 5.0 standard drinks     Types: 4 - 5 Cans of beer per week     Comment: patient recently moved--drinking more frequently    Drug use: Yes     Frequency: 7.0 times per week     Types: Marijuana [place])     Comment: daily     ALLERGIES:  Allergies   Allergen Reactions    Pcn [Penicillins] Itching    Seasonal      FAMILY HISTORY:  Family History   Problem Relation Age of Onset    COPD Mother     Heart Disease Father     Heart Attack Father     Heart Disease Half-Brother     Heart Disease Half-Brother     Heart Disease Half-Brother      CURRENT MEDICATIONS:  Current Outpatient Medications   Medication Sig Dispense Refill    QUEtiapine (SEROQUEL) 25 MG tablet TAKE 1 TABLET BY MOUTH EVERY NIGHT 90 tablet 1    albuterol sulfate HFA (PROVENTIL;VENTOLIN;PROAIR) 108 (90 Base) MCG/ACT inhaler Inhale 2 puffs into the lungs every 4 hours as needed for Wheezing 6.7 g 5    amLODIPine (NORVASC) 10 MG tablet Take 1 tablet by mouth daily 90 tablet 1    fluticasone-umeclidin-vilant (TRELEGY ELLIPTA) 100-62.5-25 MCG/INH AEPB Inhale 1 puff into the lungs daily (Patient taking differently: Inhale 1 puff into the lungs daily LOT: UH2V EXP: 2024) 3 each 3    valsartan (DIOVAN) 320 MG tablet Take 1 tablet by mouth daily 90 tablet 3    tamsulosin (FLOMAX) 0.4 MG capsule Take 1 capsule by mouth daily 90 capsule 3    rosuvastatin (CRESTOR) 10 MG tablet Take 1 tablet by mouth nightly 90 tablet 3    flecainide (TAMBOCOR) 100 MG tablet Take 1 tablet by mouth 2 times daily 180 tablet 3    propranolol (INDERAL LA) 120 MG extended release capsule Take 1 capsule by mouth daily 90 capsule 3    Multiple Vitamins-Minerals (MULTIVITAMIN ADULTS PO) Take 1 tablet by mouth daily       No current facility-administered medications for this visit. ROS   Review of Systems   Constitutional: Negative. Negative for chills, fever and unexpected weight change. HENT: Negative. Eyes: Negative. Respiratory:  Positive for shortness of breath (only with exertion). Negative for chest tightness, wheezing and stridor. Cardiovascular:  Negative for chest pain and leg swelling. Gastrointestinal: Negative. Negative for diarrhea, nausea and vomiting. Endocrine: Negative. Genitourinary: Negative. Negative for dysuria. Musculoskeletal: Negative. Skin: Negative. Allergic/Immunologic: Negative. Neurological: Negative. Hematological: Negative. Psychiatric/Behavioral: Negative. Physical exam   BP (!) 144/80 (Site: Right Upper Arm, Position: Sitting, Cuff Size: Medium Adult)   Pulse 58   Temp 97.9 °F (36.6 °C) (Skin)   Ht 5' 7\" (1.702 m)   Wt 191 lb 9.6 oz (86.9 kg)   SpO2 98%   BMI 30.01 kg/m²    Wt Readings from Last 3 Encounters:   07/22/22 191 lb 9.6 oz (86.9 kg)   06/13/22 184 lb (83.5 kg)   04/06/22 190 lb (86.2 kg)       Physical Exam  Vitals and nursing note reviewed. Constitutional:       General: He is not in acute distress. Appearance: He is well-developed. He is obese. HENT:      Head: Normocephalic and atraumatic. Neck:      Trachea: No tracheal deviation. Cardiovascular:      Rate and Rhythm: Normal rate and regular rhythm. Heart sounds: Normal heart sounds. No murmur heard. Pulmonary:      Effort: Pulmonary effort is normal. No respiratory distress. Breath sounds: Normal breath sounds. No stridor. No wheezing or rales. Chest:      Chest wall: No tenderness. Abdominal:      General: Bowel sounds are normal. There is no distension. Palpations: Abdomen is soft. Skin:     General: Skin is warm and dry. Capillary Refill: Capillary refill takes less than 2 seconds.    Neurological: Mental Status: He is alert and oriented to person, place, and time. Psychiatric:         Behavior: Behavior normal.         Thought Content: Thought content normal.         Judgment: Judgment normal.        results   Lung Nodule Screening     [] Qualifies    [x] Does not qualify   [] Declined    [] Completed    The USPSTF recommends annual screening for lung cancer with low-dose computed tomography (LDCT) in adults aged 48 to [de-identified] years who have a 30 pack-year smoking history and currently smoke or have quit within the past 20 years. Screening should be discontinued once a person has not smoked for 20 years or develops a health problem that substantially limits life expectancy or the ability or willingness to have curative lung surgery. Assessment      Diagnosis Orders   1. Stage 2 moderate COPD by GOLD classification ([address]        2. Cannabis abuse        3. Obesity (BMI 30-39. 9)              Plan   -Discussed Cannabis use  -Continue current inhaler regimen as per PCP  -Trelegy samples given to patient today x 4  -Weight loss encouraged discussed reaching out to PCP to see about weight loss options   -Advised to maintain pneumonia vaccine with PCP and to take flu vaccine this coming season.  -Advised patient to call office with any changes, questions, or concerns regarding respiratory status    Will see Donal Soulier back in: 1 year    Ruba Sanon [facility]  7/22/2022

## 2024-01-02 ENCOUNTER — OFFICE VISIT (OUTPATIENT)
Dept: PRIMARY CARE CLINIC | Facility: CLINIC | Age: 54
End: 2024-01-02
Payer: COMMERCIAL

## 2024-01-02 ENCOUNTER — LAB VISIT (OUTPATIENT)
Dept: LAB | Facility: HOSPITAL | Age: 54
End: 2024-01-02
Attending: INTERNAL MEDICINE
Payer: COMMERCIAL

## 2024-01-02 ENCOUNTER — PATIENT MESSAGE (OUTPATIENT)
Dept: PRIMARY CARE CLINIC | Facility: CLINIC | Age: 54
End: 2024-01-02

## 2024-01-02 VITALS
HEART RATE: 81 BPM | WEIGHT: 107.81 LBS | SYSTOLIC BLOOD PRESSURE: 106 MMHG | DIASTOLIC BLOOD PRESSURE: 74 MMHG | HEIGHT: 64 IN | BODY MASS INDEX: 18.4 KG/M2 | OXYGEN SATURATION: 98 %

## 2024-01-02 DIAGNOSIS — Z00.00 PREVENTATIVE HEALTH CARE: Primary | ICD-10-CM

## 2024-01-02 DIAGNOSIS — E28.319 PREMATURE MENOPAUSE: ICD-10-CM

## 2024-01-02 DIAGNOSIS — F41.8 SITUATIONAL ANXIETY: ICD-10-CM

## 2024-01-02 DIAGNOSIS — Z00.00 PREVENTATIVE HEALTH CARE: ICD-10-CM

## 2024-01-02 PROBLEM — D70.8 OTHER NEUTROPENIA: Status: RESOLVED | Noted: 2022-12-27 | Resolved: 2024-01-02

## 2024-01-02 LAB
25(OH)D3+25(OH)D2 SERPL-MCNC: 103 NG/ML (ref 30–96)
ALBUMIN SERPL BCP-MCNC: 4.7 G/DL (ref 3.5–5.2)
ALP SERPL-CCNC: 90 U/L (ref 55–135)
ALT SERPL W/O P-5'-P-CCNC: 13 U/L (ref 10–44)
ANION GAP SERPL CALC-SCNC: 13 MMOL/L (ref 8–16)
AST SERPL-CCNC: 21 U/L (ref 10–40)
BILIRUB SERPL-MCNC: 0.4 MG/DL (ref 0.1–1)
BUN SERPL-MCNC: 14 MG/DL (ref 6–20)
CALCIUM SERPL-MCNC: 10 MG/DL (ref 8.7–10.5)
CHLORIDE SERPL-SCNC: 101 MMOL/L (ref 95–110)
CHOLEST SERPL-MCNC: 256 MG/DL (ref 120–199)
CHOLEST/HDLC SERPL: 2.9 {RATIO} (ref 2–5)
CO2 SERPL-SCNC: 27 MMOL/L (ref 23–29)
CREAT SERPL-MCNC: 0.7 MG/DL (ref 0.5–1.4)
EST. GFR  (NO RACE VARIABLE): >60 ML/MIN/1.73 M^2
ESTIMATED AVG GLUCOSE: 103 MG/DL (ref 68–131)
GLUCOSE SERPL-MCNC: 90 MG/DL (ref 70–110)
HBA1C MFR BLD: 5.2 % (ref 4–5.6)
HDLC SERPL-MCNC: 89 MG/DL (ref 40–75)
HDLC SERPL: 34.8 % (ref 20–50)
LDLC SERPL CALC-MCNC: 148.4 MG/DL (ref 63–159)
NONHDLC SERPL-MCNC: 167 MG/DL
POTASSIUM SERPL-SCNC: 3.8 MMOL/L (ref 3.5–5.1)
PROT SERPL-MCNC: 8.1 G/DL (ref 6–8.4)
SODIUM SERPL-SCNC: 141 MMOL/L (ref 136–145)
TRIGL SERPL-MCNC: 93 MG/DL (ref 30–150)

## 2024-01-02 PROCEDURE — 1159F MED LIST DOCD IN RCRD: CPT | Mod: CPTII,S$GLB,, | Performed by: INTERNAL MEDICINE

## 2024-01-02 PROCEDURE — 3008F BODY MASS INDEX DOCD: CPT | Mod: CPTII,S$GLB,, | Performed by: INTERNAL MEDICINE

## 2024-01-02 PROCEDURE — 80061 LIPID PANEL: CPT | Performed by: INTERNAL MEDICINE

## 2024-01-02 PROCEDURE — 3074F SYST BP LT 130 MM HG: CPT | Mod: CPTII,S$GLB,, | Performed by: INTERNAL MEDICINE

## 2024-01-02 PROCEDURE — 83036 HEMOGLOBIN GLYCOSYLATED A1C: CPT | Performed by: INTERNAL MEDICINE

## 2024-01-02 PROCEDURE — 36415 COLL VENOUS BLD VENIPUNCTURE: CPT | Mod: PN | Performed by: INTERNAL MEDICINE

## 2024-01-02 PROCEDURE — 99396 PREV VISIT EST AGE 40-64: CPT | Mod: S$GLB,,, | Performed by: INTERNAL MEDICINE

## 2024-01-02 PROCEDURE — 80053 COMPREHEN METABOLIC PANEL: CPT | Performed by: INTERNAL MEDICINE

## 2024-01-02 PROCEDURE — 3078F DIAST BP <80 MM HG: CPT | Mod: CPTII,S$GLB,, | Performed by: INTERNAL MEDICINE

## 2024-01-02 PROCEDURE — 82306 VITAMIN D 25 HYDROXY: CPT | Performed by: INTERNAL MEDICINE

## 2024-01-02 PROCEDURE — 99999 PR PBB SHADOW E&M-EST. PATIENT-LVL III: CPT | Mod: PBBFAC,,, | Performed by: INTERNAL MEDICINE

## 2024-01-02 RX ORDER — LORAZEPAM 0.5 MG/1
0.5 TABLET ORAL NIGHTLY
Qty: 5 TABLET | Refills: 0 | Status: SHIPPED | OUTPATIENT
Start: 2024-01-02 | End: 2024-02-01

## 2024-01-02 NOTE — PROGRESS NOTES
[facility] Progress Note      Chief Complaint     Annual Exam       Subjective:      History of Present Illness:  Bernice Krause is a 53 y.o. female here for annual PE     Overall doing well without acute issues and no recent hospitalizations     Currently still working on application for Permanent residency - got all required immunizations MMR, H influ B, Hep B, Tdap, COVID booster, flu shot.   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    Past Surgical History:  No past surgical history on file.    Allergies:  Review of patient's allergies indicates:   Allergen Reactions    Lactase Other (See Comments)     Other reaction(s): Abdominal Pain       Home Medications:  Current Outpatient Medications   Medication Sig Dispense Refill    valACYclovir (VALTREX) 1000 MG tablet Take 4,000 mg by mouth.      LORazepam (ATIVAN) 0.5 MG tablet Take 1 tablet (0.5 mg total) by mouth every evening. 5 tablet 0     No current facility-administered medications for this visit.        Family History:  No family history on file.    Social History:  Social History     Tobacco  "Use    Smoking status: Never    Smokeless tobacco: Never       Review of Systems:  Pertinent positives and negatives listed in HPI. All other systems are reviewed and are negative.    Health Maintaince :   Health Maintenance Topics with due status: Not Due       Topic Last Completion Date    Lipid Panel 12/20/2022    TETANUS VACCINE 01/03/2023           Eye:  left eye weak eye requiring contacts - went 2 mo ago   Dental: UTD every 2-3 mo    Immunizations:   Tdap: 2013.  Influenza: UTD   Shingrex x 2: eligible  COVID: x 4; UTD  Hepatitis C: UTD  Cancer Screening:  PAP: refuses for now; last pap at 32 y/o   Mammogram: needs  Colonoscopy: refuses for now  The 10-year ASCVD risk score (Olga Lidia PRADO, et al., 2019) is: 0.8%    Values used to calculate the score:      Age: 53 years      Sex: Female      Is Non- : No      Diabetic: No      Tobacco smoker: No      Systolic Blood Pressure: 106 mmHg      Is BP treated: No      HDL Cholesterol: 81 mg/dL      Total Cholesterol: 236 mg/dL      Objective:   /74 (BP Location: Right arm, Patient Position: Sitting, BP Method: Medium (Manual))   Pulse 81   Ht 5' 4" (1.626 m)   Wt 48.9 kg (107 lb 12.9 oz)   SpO2 98%   BMI 18.50 kg/m²      Body mass index is 18.5 kg/m².       Physical Examination:  General: Alert and awake in no apparent distress  HEENT: Normocephalic and atraumatic; Tms WNL  Eyes:  PERRL; EOMi with anicteric sclera and clear conjunctivae  Mouth:  Oropharynx clear and without exudate; moist mucous membranes  Neck:   Cervical nodes not enlarged; supple; no bruits  Cardio:  Regular rate and rhythm with normal S1 and S2; no murmurs or rubs  Resp:  CTAB; respirations unlabored; no wheezes, crackles or rhonchi  Abdom: Soft, NTND with normoactive bowel sounds; negative HSM  Extrem: Warm and well-perfused with no clubbing, cyanosis or edema  Skin:  No rashes, lesions, or color changes  Pulses:  2+ and symmetric distally  Neuro:  AAOx3; " cooperative and pleasant with no focal deficits    Laboratory:      Most Recent Data:  Lab Results   Component Value Date    WBC 4.29 12/27/2022    HGB 13.4 12/27/2022    HCT 41.4 12/27/2022     12/27/2022    CHOL 236 (H) 12/20/2022    TRIG 97 12/20/2022    HDL 81 (H) 12/20/2022    ALT 8 (L) 12/20/2022    AST 19 12/20/2022     12/20/2022    K 3.8 12/20/2022     12/20/2022    BUN 16 12/20/2022    CO2 25 12/20/2022    TSH 0.79 02/22/2007              CBC:   WBC   Date Value Ref Range Status   12/27/2022 4.29 3.90 - 12.70 K/uL Final     Hemoglobin   Date Value Ref Range Status   12/27/2022 13.4 12.0 - 16.0 g/dL Final     Hematocrit   Date Value Ref Range Status   12/27/2022 41.4 37.0 - 48.5 % Final     Platelets   Date Value Ref Range Status   12/27/2022 295 150 - 450 K/uL Final     MCV   Date Value Ref Range Status   12/27/2022 96 82 - 98 fL Final     RDW   Date Value Ref Range Status   12/27/2022 12.5 11.5 - 14.5 % Final     BMP:   Sodium   Date Value Ref Range Status   12/20/2022 139 136 - 145 mmol/L Final     Potassium   Date Value Ref Range Status   12/20/2022 3.8 3.5 - 5.1 mmol/L Final     Chloride   Date Value Ref Range Status   12/20/2022 103 95 - 110 mmol/L Final     CO2   Date Value Ref Range Status   12/20/2022 25 23 - 29 mmol/L Final     BUN   Date Value Ref Range Status   12/20/2022 16 6 - 20 mg/dL Final     Creatinine   Date Value Ref Range Status   12/20/2022 0.7 0.5 - 1.4 mg/dL Final     Glucose   Date Value Ref Range Status   12/20/2022 81 70 - 110 mg/dL Final     Calcium   Date Value Ref Range Status   12/20/2022 9.7 8.7 - 10.5 mg/dL Final     LFTs:   Total Protein   Date Value Ref Range Status   12/20/2022 7.5 6.0 - 8.4 g/dL Final     Albumin   Date Value Ref Range Status   12/20/2022 4.3 3.5 - 5.2 g/dL Final     Total Bilirubin   Date Value Ref Range Status   12/20/2022 0.4 0.1 - 1.0 mg/dL Final     Comment:     For infants and newborns, interpretation of results should be  "based  on gestational age, weight and in agreement with clinical  observations.    Premature Infant recommended reference ranges:  Up to 24 hours.............<8.0 mg/dL  Up to 48 hours............<12.0 mg/dL  3-5 days..................<15.0 mg/dL  6-29 days.................<15.0 mg/dL       AST   Date Value Ref Range Status   12/20/2022 19 10 - 40 U/L Final     Alkaline Phosphatase   Date Value Ref Range Status   12/20/2022 72 55 - 135 U/L Final     ALT   Date Value Ref Range Status   12/20/2022 8 (L) 10 - 44 U/L Final     Coags: No results found for: "INR", "PROTIME", "PTT"  FLP:      Lab Results   Component Value Date    CHOL 236 (H) 12/20/2022     Lab Results   Component Value Date    HDL 81 (H) 12/20/2022     Lab Results   Component Value Date    LDLCALC 135.6 12/20/2022     Lab Results   Component Value Date    TRIG 97 12/20/2022     Lab Results   Component Value Date    CHOLHDL 34.3 12/20/2022      DM:      LDL Cholesterol   Date Value Ref Range Status   12/20/2022 135.6 63.0 - 159.0 mg/dL Final     Comment:     The National Cholesterol Education Program (NCEP) has set the  following guidelines (reference values) for LDL Cholesterol:  Optimal.......................<130 mg/dL  Borderline High...............130-159 mg/dL  High..........................160-189 mg/dL  Very High.....................>190 mg/dL       Creatinine   Date Value Ref Range Status   12/20/2022 0.7 0.5 - 1.4 mg/dL Final     Thyroid:   TSH   Date Value Ref Range Status   02/22/2007 0.79 0.4 - 4.0 uIU/ml Final     T4, Total   Date Value Ref Range Status   02/22/2007 7.9 4.5 - 11.5 ug/dl Final     Anemia: No results found for: "IRON", "TIBC", "FERRITIN", "[id number]", "FOLATE"  Cardiac: No results found for: "TROPONINI", "CKTOTAL", "CKMB", "BNP"  Urinalysis:   Color, UA   Date Value Ref Range Status   12/20/2022 Yellow Yellow, Straw, June Final     Specific Gravity, UA   Date Value Ref Range Status   12/20/2022 1.025 1.005 - 1.030 Final "     Nitrite, UA   Date Value Ref Range Status   12/20/2022 Negative Negative Final     Ketones, UA   Date Value Ref Range Status   12/20/2022 Negative Negative Final       Other Results:  EKG (my interpretation):     Radiology:  No image results found.          Assessment/Plan     Bernice Krause is a 53 y.o. female with:  1. Preventative health care  Assessment & Plan:  Current labs today  DEXA and MMG at [facility] in Feb 2024   Stool FIT   Pap smear with next appt     Orders:  -     Lipid Panel; Future; Expected date: 01/02/2024  -     Hemoglobin A1C; Future; Expected date: 01/02/2024  -     Urinalysis; Future; Expected date: 01/02/2024  -     Comprehensive Metabolic Panel; Future; Expected date: 01/02/2024  -     Cancel: Mammo Digital Screening Bilat; Future; Expected date: 01/02/2024  -     Fecal Immunochemical Test (iFOBT); Future; Expected date: 01/02/2024  -     Mammo Digital Screening Bilat; Future; Expected date: 01/02/2024    2. Situational anxiety  Assessment & Plan:  Take Ativan 0.5 mg po qhs prn     Orders:  -     LORazepam (ATIVAN) 0.5 MG tablet; Take 1 tablet (0.5 mg total) by mouth every evening.  Dispense: 5 tablet; Refill: 0    3. Premature menopause  Assessment & Plan:  Check Vit D and schedule DEXA    Orders:  -     DXA Bone Density Axial Skeleton 1 or more sites; Future; Expected date: 01/02/2024  -     Vitamin D; Future; Expected date: 01/02/2024             I spent a total of 22 minutes on the day of the visit.This includes face to face time and non-face to face time preparing to see the patient (eg, review of tests), obtaining and/or reviewing separately obtained history, documenting clinical information in the electronic or other health record, independently interpreting results and communicating results to the patient/family/caregiver, or care coordinator.   Code Status:     Cara aLmb MD

## 2024-01-02 NOTE — ASSESSMENT & PLAN NOTE
Current labs today  DEXA and MMG at [facility] in Feb 2024   Stool FIT   Pap smear with next appt

## 2024-01-04 ENCOUNTER — PATIENT OUTREACH (OUTPATIENT)
Dept: ADMINISTRATIVE | Facility: HOSPITAL | Age: 54
End: 2024-01-04
Payer: COMMERCIAL

## 2024-01-04 NOTE — PROGRESS NOTES

## 2024-02-12 ENCOUNTER — HOSPITAL ENCOUNTER (OUTPATIENT)
Dept: RADIOLOGY | Facility: HOSPITAL | Age: 54
Discharge: HOME | End: 2024-02-12
Attending: INTERNAL MEDICINE
Payer: COMMERCIAL

## 2024-02-12 DIAGNOSIS — Z00.00 PREVENTATIVE HEALTH CARE: ICD-10-CM

## 2024-02-12 PROCEDURE — 77067 SCR MAMMO BI INCL CAD: CPT | Mod: TC,PO

## 2024-02-12 PROCEDURE — 77067 SCR MAMMO BI INCL CAD: CPT | Mod: 26,,, | Performed by: RADIOLOGY

## 2024-02-12 PROCEDURE — 77063 BREAST TOMOSYNTHESIS BI: CPT | Mod: 26,,, | Performed by: RADIOLOGY

## 2024-09-04 ENCOUNTER — OFFICE VISIT (OUTPATIENT)
Dept: DERMATOLOGY | Facility: CLINIC | Age: 54
End: 2024-09-04
Payer: COMMERCIAL

## 2024-09-04 DIAGNOSIS — L82.1 SK (SEBORRHEIC KERATOSIS): Primary | ICD-10-CM

## 2024-09-04 PROCEDURE — 3044F HG A1C LEVEL LT 7.0%: CPT | Mod: CPTII,S$GLB,, | Performed by: DERMATOLOGY

## 2024-09-04 PROCEDURE — 1160F RVW MEDS BY RX/DR IN RCRD: CPT | Mod: CPTII,S$GLB,, | Performed by: DERMATOLOGY

## 2024-09-04 PROCEDURE — 1159F MED LIST DOCD IN RCRD: CPT | Mod: CPTII,S$GLB,, | Performed by: DERMATOLOGY

## 2024-09-04 PROCEDURE — 99202 OFFICE O/P NEW SF 15 MIN: CPT | Mod: S$GLB,,, | Performed by: DERMATOLOGY

## 2024-09-04 PROCEDURE — 99999 PR PBB SHADOW E&M-EST. PATIENT-LVL III: CPT | Mod: PBBFAC,,, | Performed by: DERMATOLOGY

## 2024-09-04 NOTE — PROGRESS NOTES
Subjective:      Patient ID:  Bernice Krause is a 54 y.o. female who presents for   Chief Complaint   Patient presents with    Lesion     Forehead      Lesion - Initial  Affected locations: forehead  Duration: 2 months  Signs / symptoms: asymptomatic  Aggravated by: nothing  Relieving factors/Treatments tried: nothing  Improvement on treatment: no relief    Pt notes scratching the area, and the lesion flaked off.  She feels the area is improving.    Review of Systems   Skin:  Positive for daily sunscreen use (face), activity-related sunscreen use (sun protective clothing), recent sunburn (07/2024: shoulders) and wears hat.   Hematologic/Lymphatic: Bruises/bleeds easily.       Objective:   Physical Exam   Constitutional: She appears well-developed and well-nourished. No distress.   Neurological: She is alert and oriented to person, place, and time. She is not disoriented.   Psychiatric: She has a normal mood and affect.   Skin:   Areas Examined (abnormalities noted in diagram):   Head / Face Inspection Performed            Diagram Legend     Erythematous scaling macule/papule c/w actinic keratosis       Vascular papule c/w angioma      Pigmented verrucoid papule/plaque c/w seborrheic keratosis      Yellow umbilicated papule c/w sebaceous hyperplasia      Irregularly shaped tan macule c/w lentigo     1-2 mm smooth white papules consistent with Milia      Movable subcutaneous cyst with punctum c/w epidermal inclusion cyst      Subcutaneous movable cyst c/w pilar cyst      Firm pink to brown papule c/w dermatofibroma      Pedunculated fleshy papule(s) c/w skin tag(s)      Evenly pigmented macule c/w junctional nevus     Mildly variegated pigmented, slightly irregular-bordered macule c/w mildly atypical nevus      Flesh colored to evenly pigmented papule c/w intradermal nevus       Pink pearly papule/plaque c/w basal cell carcinoma      Erythematous hyperkeratotic cursted plaque c/w SCC      Surgical scar with no  sign of skin cancer recurrence      Open and closed comedones      Inflammatory papules and pustules      Verrucoid papule consistent consistent with wart     Erythematous eczematous patches and plaques     Dystrophic onycholytic nail with subungual debris c/w onychomycosis     Umbilicated papule    Erythematous-base heme-crusted tan verrucoid plaque consistent with inflamed seborrheic keratosis     Erythematous Silvery Scaling Plaque c/w Psoriasis     See annotation      Assessment / Plan:        SK (seborrheic keratosis)  These are benign inherited growths without a malignant potential. Reassurance given to patient. No treatment is necessary.   Treatment of benign, asymptomatic lesions may be considered cosmetic.  Warned about risk of hypo- or hyperpigmentation with treatment and risk of recurrence.    RTC prn

## 2024-09-04 NOTE — PATIENT INSTRUCTIONS
CRYOSURGERY      Your doctor has used a method called cryosurgery to treat your skin condition. Cryosurgery refers to the use of very cold substances to treat a variety of skin conditions such as warts, pre-skin cancers, molluscum contagiosum, sun spots, and several benign growths. The substance we use in cryosurgery is liquid nitrogen and is so cold (-195 degrees Celsius) that is burns when administered.     Following treatment in the office, the skin may immediately burn and become red. You may find the area around the lesion is affected as well. It is sometimes necessary to treat not only the lesion, but a small area of the surrounding normal skin to achieve a good response.     A blister, and even a blood filled blister, may form after treatment.   This is a normal response. If the blister is painful, it is acceptable to sterilize a needle and with rubbing alcohol and gently pop the blister. It is important that you gently wash the area with soap and warm water as the blister fluid may contain wart virus if a wart was treated. Do no remove the roof of the blister.     The area treated can take anywhere from 1-3 weeks to heal. Healing time depends on the kind skin lesion treated, the location, and how aggressively the lesion was treated. It is recommended that the areas treated are covered with Vaseline or bacitracin ointment and a band-aid. If a band-aid is not practical, just ointment applied several times per day will do. Keeping these areas moist will speed the healing time.    Treatment with liquid nitrogen can leave a scar. In dark skin, it may be a light or dark scar, in light skin it may be a white or pink scar. These will generally fade with time, but may never go away completely.     If you have any concerns after your treatment, please feel free to call the office.       [address] [place] [address]/ [phone] FAX/ [email or web address] Sun Protection      The [facility] would like to remind you of the importance of sun protection all year round and particularly during the summer when the suns rays are the strongest. It has been proven that both acute and chronic sun exposure damages our cells and leads to skin cancer. Beyond skin cancer, the sun causes 90% of the symptoms of premature skin aging, including wrinkles, lentigines (brown spots), and thin, easily bruised skin. Proper sun protection can help prevent these unwanted conditions.    Many patients report that they dont go in the sun. It has been shown that the average person receives 18 hours of incidental sun exposure per week during activities such as walking through parking lots, driving, or sitting next to windows. This accumulates to several bad sunburns per year!    In choosing sunscreen, you want one that protects against both UVA and UVB rays (broad spectrum). It is recommended that you use one of SPF 30 or higher. It is important to apply the sunscreen about 20 minutes prior to sun exposure. Most sunscreens are chemical sunscreens and a reaction must take place in the skin so that they are effective. If they are applied and then you are immediately exposed to the sun or start sweating, this reaction has not had time to take place and you are therefore unprotected. Sunscreen needs to be reapplied every 2 hours if you are participating in water sports or sweating. We recommend Elta MD or CeraVe sunscreens for daily use; however there are many options and it is most important for you to find one that you will use on a consistent basis.    If you have sensitive skin, you may do best with a sunscreen that contains only physical blockers in the active ingredient section. The only physical blockers available in the [place] currently are titanium dioxide or zinc oxide. These are typically thicker and harder to apply, however they afford very good protection. Neutrogena Sensitive Skin, Blue Lizard  Sensitive Skin (pink top) or Neutrogena Pure and Free are popular ones.     Aside from sunscreen, clothes with UV protection (UPF), wide brimmed hats, and sunglasses are other means of sun protection that we recommend.      Based on a recent study (6/2021) and out of an abundance of caution, we are recommending that you AVOID the following sunscreens as they may contain the carcinogen, benzene:    Spray and gel sunscreens  Any [facility] or [facility] brands as well as Max Block and TopCare brands   Neutrogena Ultra Sheer Dry-touch Water Resistant Sunscreen LOTION SPF 70   Neutrogena Sheer Zinc Dry-touch Face Sunscreen LOTION SPF 50   5.   Aveeno Baby Continuous Protection Sensitive Skin Sunscreen LOTION - Broad Spectrum SPF 50    Please note that Benzene is not an ingredient or the degradation product of any ingredient in any sunscreen. This study suggested that the findings are a result of contamination in the manufacturing process. At this point, we don't know how effectively Benzene gets through the skin, if it gets absorbed systemically, and what effects it may have.     We do know that ultraviolet radiation is a well-established carcinogen. Please use daily sun protection/avoidance and use of at least SPF 30, broad-spectrum sunscreen not listed above.                       [facility]  CHRISTOPHER LANGE - DERMATOLOGY [address]  [facility] [address]  Dept: [phone]  Dept Fax: [phone]

## 2025-02-26 DIAGNOSIS — Z12.31 OTHER SCREENING MAMMOGRAM: ICD-10-CM

## 2025-02-26 NOTE — PROGRESS NOTES
[facility] Progress Note      Chief Complaint     Annual Exam (Pt here for annual exam ) and Fatigue       Subjective:      History of Present Illness:  Bernice Krause is a 55 y.o. female here for annual PE     Overall doing well  but feels very tired - goes to school at 6:30am and works until 3pm  -sleeps 8pm - 2am and then falls back to sleep and then awakens at 4:30am  -loves her job and the kids   -always skips dinner but eats large breakfast and lunch; drinks a lot of water     Osteopenia: 2024   Lumbar spine (L1-L3):               T-score is -1.3, and Z-score is -0.4.     L4 was excluded due to an overlying radiodense object.     Femoral neck:                          T-score is -1.4, and Z-score is -0.4.     Total hip:                                T-score is -1.8, and Z-score is -1.2.    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[place], [employer], [place]; from [place]; single, no kides     PSH: T&A  Med: Valtrex prn fever blister - rarely uses it - breakouts every 2-3 years     SH: not sexually active; exercises almost daily: walks a lot [phone] during school-time and 10,000 steps on the weekend does not smoke, drink, drugs; finally got her Green card in 2024   FH: dad  leukemia (spine) exposed to radiowaves at 73y/o;  mom alive at 75 y/o needs TKR; middle of 3 kids - has brother who drinks a lot of alcohol still alive     Past Medical History:  Past Medical History:   Diagnosis Date    Situational anxiety 2024       Past Surgical History:  History reviewed. No pertinent surgical history.    Allergies:  Review of patient's allergies indicates:   Allergen Reactions    Lactase Other (See Comments)     Other  reaction(s): Abdominal Pain       Home Medications:  Current Outpatient Medications   Medication Sig Dispense Refill    calcium citrate (CALCITRATE) 200 mg (950 mg) tablet Take 1 tablet by mouth once daily.      valACYclovir (VALTREX) 1000 MG tablet Take 4,000 mg by mouth.      LORazepam (ATIVAN) 0.5 MG tablet Take 1 tablet (0.5 mg total) by mouth every evening. 5 tablet 0     No current facility-administered medications for this visit.        Family History:  Family History   Problem Relation Name Age of Onset    Melanoma Neg Hx         Social History:  Social History     Tobacco Use    Smoking status: Never    Smokeless tobacco: Never   Substance Use Topics    Alcohol use: Not Currently    Drug use: Never       Review of Systems:  Pertinent positives and negatives listed in HPI. All other systems are reviewed and are negative.    Health Maintaince :   Health Maintenance Topics with due status: Not Due       Topic Last Completion Date    TETANUS VACCINE 01/03/2023    Lipid Panel 01/02/2024    DEXA Scan 02/12/2024    RSV Vaccine (Age 60+ and Pregnant patients) Not Due           Eye:  left eye weak eye requiring contacts - went 1 mo ago   Dental: UTD every 2-3 mo    Immunizations:   Tdap: 2013.  Influenza: UTD at [employer]   Shingrex x 2: eligible  COVID: x 4; UTD at [address]   Hepatitis C: UTD  Cancer Screening:  PAP: refuses for now; last pap at 30 y/o   Mammogram: needs now  Colonoscopy: refuses for now  DEXA: 2/2024  Lumbar spine (L1-L3):               T-score is -1.3, and Z-score is -0.4.     L4 was excluded due to an overlying radiodense object.     Femoral neck:                          T-score is -1.4, and Z-score is -0.4.     Total hip:                                T-score is -1.8, and Z-score is -1.2.  The 10-year ASCVD risk score (Olga Lidia PRADO, et al., 2019) is: 1%    Values used to calculate the score:      Age: 55 years      Sex: Female      Is Non- : No       "Diabetic: No      Tobacco smoker: No      Systolic Blood Pressure: 108 mmHg      Is BP treated: No      HDL Cholesterol: 89 mg/dL      Total Cholesterol: 256 mg/dL      Objective:   /60 (BP Location: Left arm, Patient Position: Sitting)   Pulse 75   Ht 5' 4" (1.626 m)   Wt 48.5 kg (106 lb 14.8 oz)   SpO2 98%   BMI 18.35 kg/m²      Body mass index is 18.35 kg/m².       Physical Examination:  General: Alert and awake in no apparent distress  HEENT: Normocephalic and atraumatic; Tms WNL  Eyes:  PERRL; EOMi with anicteric sclera and clear conjunctivae  Mouth:  Oropharynx clear and without exudate; moist mucous membranes  Neck:   Cervical nodes not enlarged; supple; no bruits  Cardio:  Regular rate and rhythm with normal S1 and S2; no murmurs or rubs  Resp:  CTAB; respirations unlabored; no wheezes, crackles or rhonchi  Abdom: Soft, NTND with normoactive bowel sounds; negative HSM  Extrem: Warm and well-perfused with no clubbing, cyanosis or edema  Skin:  No rashes, lesions, or color changes  Pulses:  2+ and symmetric distally  Neuro:  AAOx3; cooperative and pleasant with no focal deficits    Laboratory:      Most Recent Data:  Lab Results   Component Value Date    WBC 4.29 12/27/2022    HGB 13.4 12/27/2022    HCT 41.4 12/27/2022     12/27/2022    CHOL 256 (H) 01/02/2024    TRIG 93 01/02/2024    HDL 89 (H) 01/02/2024    ALT 13 01/02/2024    AST 21 01/02/2024     01/02/2024    K 3.8 01/02/2024     01/02/2024    BUN 14 01/02/2024    CO2 27 01/02/2024    TSH 0.79 02/22/2007    HGBA1C 5.2 01/02/2024              CBC:   WBC   Date Value Ref Range Status   12/27/2022 4.29 3.90 - 12.70 K/uL Final     Hemoglobin   Date Value Ref Range Status   12/27/2022 13.4 12.0 - 16.0 g/dL Final     Hematocrit   Date Value Ref Range Status   12/27/2022 41.4 37.0 - 48.5 % Final     Platelets   Date Value Ref Range Status   12/27/2022 295 150 - 450 K/uL Final     MCV   Date Value Ref Range Status   12/27/2022 96 82 " "- 98 fL Final     RDW   Date Value Ref Range Status   12/27/2022 12.5 11.5 - 14.5 % Final     BMP:   Sodium   Date Value Ref Range Status   01/02/2024 141 136 - 145 mmol/L Final     Potassium   Date Value Ref Range Status   01/02/2024 3.8 3.5 - 5.1 mmol/L Final     Chloride   Date Value Ref Range Status   01/02/2024 101 95 - 110 mmol/L Final     CO2   Date Value Ref Range Status   01/02/2024 27 23 - 29 mmol/L Final     BUN   Date Value Ref Range Status   01/02/2024 14 6 - 20 mg/dL Final     Creatinine   Date Value Ref Range Status   01/02/2024 0.7 0.5 - 1.4 mg/dL Final     Glucose   Date Value Ref Range Status   01/02/2024 90 70 - 110 mg/dL Final     Calcium   Date Value Ref Range Status   01/02/2024 10.0 8.7 - 10.5 mg/dL Final     LFTs:   Total Protein   Date Value Ref Range Status   01/02/2024 8.1 6.0 - 8.4 g/dL Final     Albumin   Date Value Ref Range Status   01/02/2024 4.7 3.5 - 5.2 g/dL Final     Total Bilirubin   Date Value Ref Range Status   01/02/2024 0.4 0.1 - 1.0 mg/dL Final     Comment:     For infants and newborns, interpretation of results should be based  on gestational age, weight and in agreement with clinical  observations.    Premature Infant recommended reference ranges:  Up to 24 hours.............<8.0 mg/dL  Up to 48 hours............<12.0 mg/dL  3-5 days..................<15.0 mg/dL  6-29 days.................<15.0 mg/dL       AST   Date Value Ref Range Status   01/02/2024 21 10 - 40 U/L Final     Alkaline Phosphatase   Date Value Ref Range Status   01/02/2024 90 55 - 135 U/L Final     ALT   Date Value Ref Range Status   01/02/2024 13 10 - 44 U/L Final     Coags: No results found for: "INR", "PROTIME", "PTT"  FLP:      Lab Results   Component Value Date    CHOL 256 (H) 01/02/2024    CHOL 236 (H) 12/20/2022     Lab Results   Component Value Date    HDL 89 (H) 01/02/2024    HDL 81 (H) 12/20/2022     Lab Results   Component Value Date    LDLCALC 148.4 01/02/2024    LDLCALC 135.6 12/20/2022 " "    Lab Results   Component Value Date    TRIG 93 01/02/2024    TRIG 97 12/20/2022     Lab Results   Component Value Date    CHOLHDL 34.8 01/02/2024    CHOLHDL 34.3 12/20/2022      DM:      Hemoglobin A1C   Date Value Ref Range Status   01/02/2024 5.2 4.0 - 5.6 % Final     Comment:     ADA Screening Guidelines:  5.7-6.4%  Consistent with prediabetes  >or=6.5%  Consistent with diabetes    High levels of fetal hemoglobin interfere with the HbA1C  assay. Heterozygous hemoglobin variants (HbS, HgC, etc)do  not significantly interfere with this assay.   However, presence of multiple variants may affect accuracy.       LDL Cholesterol   Date Value Ref Range Status   01/02/2024 148.4 63.0 - 159.0 mg/dL Final     Comment:     The National Cholesterol Education Program (NCEP) has set the  following guidelines (reference values) for LDL Cholesterol:  Optimal.......................<130 mg/dL  Borderline High...............130-159 mg/dL  High..........................160-189 mg/dL  Very High.....................>190 mg/dL       Creatinine   Date Value Ref Range Status   01/02/2024 0.7 0.5 - 1.4 mg/dL Final     Thyroid:   TSH   Date Value Ref Range Status   02/22/2007 0.79 0.4 - 4.0 uIU/ml Final     T4, Total   Date Value Ref Range Status   02/22/2007 7.9 4.5 - 11.5 ug/dl Final     Anemia: No results found for: "IRON", "TIBC", "FERRITIN", "[id number]", "FOLATE"  Cardiac: No results found for: "TROPONINI", "CKTOTAL", "CKMB", "BNP"  Urinalysis:   Color, UA   Date Value Ref Range Status   01/02/2024 Yellow Yellow, Straw, June Final     Specific Gravity, UA   Date Value Ref Range Status   01/02/2024 1.020 1.005 - 1.030 Final     Nitrite, UA   Date Value Ref Range Status   01/02/2024 Negative Negative Final     Ketones, UA   Date Value Ref Range Status   01/02/2024 Negative Negative Final       Other Results:  EKG (my interpretation):     Radiology:  Mammo Digital Screening Bilat w/ Alex  Narrative: Result:  Mammo Digital Screening " Bilat w/ Alex    History:  Patient is 54 y.o. and is seen for a screening mammogram.    Films Compared:  Prior images (if available) were compared.     Findings:  This procedure was performed using tomosynthesis.   Computer-aided detection was utilized in the interpretation of this   examination.    The breasts are heterogeneously dense, which may obscure small masses.   There is no evidence of suspicious masses, microcalcifications or   architectural distortion.  Impression:    No mammographic evidence of malignancy.    BI-RADS Category 1: Negative    Recommendation:  Routine screening mammogram in 1 year is recommended.    Your estimated lifetime risk of breast cancer (to age 85) based on   Tyrer-Cuzick risk assessment model is 4.95 %.  According to the American   Cancer Society, patients with a lifetime breast cancer risk of 20% or   higher might benefit from supplemental screening tests, such as screening   breast MRI.          Assessment/Plan     Bernice Krause is a 55 y.o. female with:  1. Preventative health care  Assessment & Plan:  Schedule MMG   Schedule labs today   Schedule pap smear   Pt refuses colonoscopy and stool tests      Orders:  -     Lipid Panel; Future; Expected date: 02/28/2025  -     Hemoglobin A1C; Future; Expected date: 02/28/2025  -     Urinalysis; Future; Expected date: 02/28/2025  -     Comprehensive Metabolic Panel; Future; Expected date: 02/28/2025  -     CBC Auto Differential; Future; Expected date: 02/28/2025  -     Vitamin D; Future; Expected date: 02/28/2025    2. Premature menopause  Assessment & Plan:  Check Vit D level         3. Osteopenia of multiple sites  Assessment & Plan:  Check Vit D level   Start Citracal Max plus daily                  I spent a total of 22 minutes on the day of the visit.This includes face to face time and non-face to face time preparing to see the patient (eg, review of tests), obtaining and/or reviewing separately obtained history, documenting  clinical information in the electronic or other health record, independently interpreting results and communicating results to the patient/family/caregiver, or care coordinator.   Code Status:     Cara Lamb MD

## 2025-02-28 ENCOUNTER — TELEPHONE (OUTPATIENT)
Dept: PRIMARY CARE CLINIC | Facility: CLINIC | Age: 55
End: 2025-02-28
Payer: COMMERCIAL

## 2025-02-28 ENCOUNTER — OFFICE VISIT (OUTPATIENT)
Dept: PRIMARY CARE CLINIC | Facility: CLINIC | Age: 55
End: 2025-02-28
Payer: COMMERCIAL

## 2025-02-28 VITALS
SYSTOLIC BLOOD PRESSURE: 108 MMHG | DIASTOLIC BLOOD PRESSURE: 60 MMHG | BODY MASS INDEX: 18.26 KG/M2 | HEIGHT: 64 IN | WEIGHT: 106.94 LBS | HEART RATE: 75 BPM | OXYGEN SATURATION: 98 %

## 2025-02-28 DIAGNOSIS — Z00.00 PREVENTATIVE HEALTH CARE: Primary | ICD-10-CM

## 2025-02-28 DIAGNOSIS — E28.319 PREMATURE MENOPAUSE: ICD-10-CM

## 2025-02-28 DIAGNOSIS — R62.7 POOR WEIGHT GAIN IN ADULT: ICD-10-CM

## 2025-02-28 DIAGNOSIS — M85.89 OSTEOPENIA OF MULTIPLE SITES: ICD-10-CM

## 2025-02-28 PROBLEM — F41.8 SITUATIONAL ANXIETY: Status: RESOLVED | Noted: 2024-01-02 | Resolved: 2025-02-28

## 2025-02-28 PROCEDURE — 99999 PR PBB SHADOW E&M-EST. PATIENT-LVL III: CPT | Mod: PBBFAC,,, | Performed by: INTERNAL MEDICINE

## 2025-02-28 RX ORDER — IBUPROFEN 200 MG
1 CAPSULE ORAL DAILY
COMMUNITY

## 2025-02-28 NOTE — ASSESSMENT & PLAN NOTE
Schedule MMG   Schedule labs today   Schedule pap smear   Pt refuses colonoscopy and stool tests

## 2025-02-28 NOTE — PATIENT INSTRUCTIONS
Preventative health care  Assessment & Plan:  Schedule MMG   Schedule labs today   Schedule pap smear   Pt refuses colonoscopy and stool tests      Orders:  -     Lipid Panel; Future; Expected date: 02/28/2025  -     Hemoglobin A1C; Future; Expected date: 02/28/2025  -     Urinalysis; Future; Expected date: 02/28/2025  -     Comprehensive Metabolic Panel; Future; Expected date: 02/28/2025  -     CBC Auto Differential; Future; Expected date: 02/28/2025  -     Vitamin D; Future; Expected date: 02/28/2025    Premature menopause  Assessment & Plan:  Check Vit D level         Osteopenia of multiple sites  Assessment & Plan:  Check Vit D level   Start Citracal Max plus daily

## 2025-03-03 ENCOUNTER — LAB VISIT (OUTPATIENT)
Dept: LAB | Facility: HOSPITAL | Age: 55
End: 2025-03-03
Attending: INTERNAL MEDICINE
Payer: COMMERCIAL

## 2025-03-03 ENCOUNTER — TELEPHONE (OUTPATIENT)
Dept: RADIOLOGY | Facility: HOSPITAL | Age: 55
End: 2025-03-03
Payer: COMMERCIAL

## 2025-03-03 ENCOUNTER — RESULTS FOLLOW-UP (OUTPATIENT)
Dept: PRIMARY CARE CLINIC | Facility: CLINIC | Age: 55
End: 2025-03-03

## 2025-03-03 DIAGNOSIS — Z00.00 PREVENTATIVE HEALTH CARE: ICD-10-CM

## 2025-03-03 DIAGNOSIS — R62.7 POOR WEIGHT GAIN IN ADULT: ICD-10-CM

## 2025-03-03 LAB
25(OH)D3+25(OH)D2 SERPL-MCNC: 43 NG/ML (ref 30–96)
ALBUMIN SERPL BCP-MCNC: 4.2 G/DL (ref 3.5–5.2)
ALP SERPL-CCNC: 72 U/L (ref 40–150)
ALT SERPL W/O P-5'-P-CCNC: 9 U/L (ref 10–44)
ANION GAP SERPL CALC-SCNC: 10 MMOL/L (ref 8–16)
AST SERPL-CCNC: 40 U/L (ref 10–40)
BASOPHILS # BLD AUTO: 0.04 K/UL (ref 0–0.2)
BASOPHILS NFR BLD: 1 % (ref 0–1.9)
BILIRUB SERPL-MCNC: 0.5 MG/DL (ref 0.1–1)
BUN SERPL-MCNC: 16 MG/DL (ref 6–20)
CALCIUM SERPL-MCNC: 9.5 MG/DL (ref 8.7–10.5)
CHLORIDE SERPL-SCNC: 107 MMOL/L (ref 95–110)
CHOLEST SERPL-MCNC: 239 MG/DL (ref 120–199)
CHOLEST/HDLC SERPL: 2.8 {RATIO} (ref 2–5)
CO2 SERPL-SCNC: 24 MMOL/L (ref 23–29)
CREAT SERPL-MCNC: 0.7 MG/DL (ref 0.5–1.4)
DIFFERENTIAL METHOD BLD: ABNORMAL
EOSINOPHIL # BLD AUTO: 0.1 K/UL (ref 0–0.5)
EOSINOPHIL NFR BLD: 1.4 % (ref 0–8)
ERYTHROCYTE [DISTWIDTH] IN BLOOD BY AUTOMATED COUNT: 11.9 % (ref 11.5–14.5)
EST. GFR  (NO RACE VARIABLE): >60 ML/MIN/1.73 M^2
ESTIMATED AVG GLUCOSE: 105 MG/DL (ref 68–131)
GLUCOSE SERPL-MCNC: 66 MG/DL (ref 70–110)
HBA1C MFR BLD: 5.3 % (ref 4–5.6)
HCT VFR BLD AUTO: 41.8 % (ref 37–48.5)
HDLC SERPL-MCNC: 84 MG/DL (ref 40–75)
HDLC SERPL: 35.1 % (ref 20–50)
HGB BLD-MCNC: 13.5 G/DL (ref 12–16)
IMM GRANULOCYTES # BLD AUTO: 0.01 K/UL (ref 0–0.04)
IMM GRANULOCYTES NFR BLD AUTO: 0.2 % (ref 0–0.5)
LDLC SERPL CALC-MCNC: 139.8 MG/DL (ref 63–159)
LYMPHOCYTES # BLD AUTO: 2.1 K/UL (ref 1–4.8)
LYMPHOCYTES NFR BLD: 51.3 % (ref 18–48)
MCH RBC QN AUTO: 31.2 PG (ref 27–31)
MCHC RBC AUTO-ENTMCNC: 32.3 G/DL (ref 32–36)
MCV RBC AUTO: 97 FL (ref 82–98)
MONOCYTES # BLD AUTO: 0.3 K/UL (ref 0.3–1)
MONOCYTES NFR BLD: 6.5 % (ref 4–15)
NEUTROPHILS # BLD AUTO: 1.7 K/UL (ref 1.8–7.7)
NEUTROPHILS NFR BLD: 39.6 % (ref 38–73)
NONHDLC SERPL-MCNC: 155 MG/DL
NRBC BLD-RTO: 0 /100 WBC
PLATELET # BLD AUTO: 265 K/UL (ref 150–450)
PMV BLD AUTO: 10.7 FL (ref 9.2–12.9)
POTASSIUM SERPL-SCNC: 4.3 MMOL/L (ref 3.5–5.1)
PROT SERPL-MCNC: 7.3 G/DL (ref 6–8.4)
RBC # BLD AUTO: 4.33 M/UL (ref 4–5.4)
SODIUM SERPL-SCNC: 141 MMOL/L (ref 136–145)
TRIGL SERPL-MCNC: 76 MG/DL (ref 30–150)
TSH SERPL DL<=0.005 MIU/L-ACNC: 0.67 UIU/ML (ref 0.4–4)
WBC # BLD AUTO: 4.17 K/UL (ref 3.9–12.7)

## 2025-03-03 PROCEDURE — 84443 ASSAY THYROID STIM HORMONE: CPT | Performed by: INTERNAL MEDICINE

## 2025-03-03 PROCEDURE — 80061 LIPID PANEL: CPT | Performed by: INTERNAL MEDICINE

## 2025-03-03 PROCEDURE — 85025 COMPLETE CBC W/AUTO DIFF WBC: CPT | Performed by: INTERNAL MEDICINE

## 2025-03-03 PROCEDURE — 83036 HEMOGLOBIN GLYCOSYLATED A1C: CPT | Performed by: INTERNAL MEDICINE

## 2025-03-03 PROCEDURE — 82306 VITAMIN D 25 HYDROXY: CPT | Performed by: INTERNAL MEDICINE

## 2025-03-03 PROCEDURE — 36415 COLL VENOUS BLD VENIPUNCTURE: CPT | Mod: PN | Performed by: INTERNAL MEDICINE

## 2025-03-03 PROCEDURE — 80053 COMPREHEN METABOLIC PANEL: CPT | Performed by: INTERNAL MEDICINE

## 2025-03-03 NOTE — TELEPHONE ENCOUNTER
----- Message from Kaylee sent at 3/3/2025  8:55 AM CST -----  Dr. Cara Lamb has put in a referral for a Diagnostic Mammogram. Please assist in scheduling. Preventative health care [[id number]]Thanks

## 2025-03-03 NOTE — TELEPHONE ENCOUNTER
Spoke with patient in reference to scheduling her mammogram, per he patient she is not ready to schedule at this time and will call back to schedule. Verified patient has contact information.